# Patient Record
Sex: MALE | Race: BLACK OR AFRICAN AMERICAN | NOT HISPANIC OR LATINO
[De-identification: names, ages, dates, MRNs, and addresses within clinical notes are randomized per-mention and may not be internally consistent; named-entity substitution may affect disease eponyms.]

---

## 2017-05-26 ENCOUNTER — HEALTH MAINTENANCE LETTER (OUTPATIENT)
Age: 57
End: 2017-05-26

## 2017-08-10 ENCOUNTER — OFFICE VISIT (OUTPATIENT)
Dept: FAMILY MEDICINE | Facility: CLINIC | Age: 57
End: 2017-08-10

## 2017-08-10 VITALS
RESPIRATION RATE: 18 BRPM | OXYGEN SATURATION: 97 % | BODY MASS INDEX: 18.9 KG/M2 | DIASTOLIC BLOOD PRESSURE: 86 MMHG | WEIGHT: 147.2 LBS | TEMPERATURE: 97.6 F | HEART RATE: 106 BPM | SYSTOLIC BLOOD PRESSURE: 118 MMHG

## 2017-08-10 DIAGNOSIS — Z00.00 PREVENTATIVE HEALTH CARE: ICD-10-CM

## 2017-08-10 DIAGNOSIS — Z00.00 ROUTINE GENERAL MEDICAL EXAMINATION AT A HEALTH CARE FACILITY: Primary | ICD-10-CM

## 2017-08-10 NOTE — PATIENT INSTRUCTIONS
Here is the plan from today's visit    1. Routine general medical examination at a health care facility    2. Preventative health care  The GI team will give you a call to set up an appointment for the colonoscopy and get you the medications that you need to prepare for it.     If you have any questions or difficulties with setting up the appointment, feel free to give us a call.     - GASTROENTEROLOGY ADULT REF PROCEDURE ONLY      Please call or return to clinic if your symptoms don't go away.    Follow up plan  Please make an appointment annually with us or sooner if you have concerns.    Thank you for coming to [facility] today.  Lab Testing:  **If you had lab testing today and your results are reassuring or normal they will be mailed to you or sent through [employer] within 7 days.   **If the lab tests need quick action we will call you with the results.  The phone number we will call with results is # [phone] (home) . If this is not the best number please call our clinic and change the number.  Medication Refills:  If you need any refills please call your pharmacy and they will contact us.   If you need to  your refill at a new pharmacy, please contact the new pharmacy directly. The new pharmacy will help you get your medications transferred faster.   Scheduling:  If you have any concerns about today's visit or wish to schedule another appointment please call our office during normal business hours [phone] (8-5:00 M-F)  If a referral was made to a [facility] and you don't get a call from central scheduling please call [phone].  If a Mammogram was ordered for you at [facility] call [phone] to schedule or change your appointment.  If you had an XRay/CT/Ultrasound/MRI ordered the number is [phone] to schedule or change your radiology appointment.   Medical Concerns:  If you have urgent medical concerns please call [phone] at any time of the  day.    Preventive Health Recommendations  Male Ages 50 - 64    Yearly exam:             See your health care provider every year in order to  o   Review health changes.   o   Discuss preventive care.    o   Review your medicines if your doctor has prescribed any.     Have a cholesterol test every 5 years, or more frequently if you are at risk for high cholesterol/heart disease.     Have a diabetes test (fasting glucose) every three years. If you are at risk for diabetes, you should have this test more often.     Have a colonoscopy at age 50, or have a yearly FIT test (stool test). These exams will check for colon cancer.      Talk with your health care provider about whether or not a prostate cancer screening test (PSA) is right for you.    You should be tested each year for STDs (sexually transmitted diseases), if you re at risk.     Shots: Get a flu shot each year. Get a tetanus shot every 10 years.     Nutrition:    Eat at least 5 servings of fruits and vegetables daily.     Eat whole-grain bread, whole-wheat pasta and brown rice instead of white grains and rice.     Talk to your provider about Calcium and Vitamin D.     Lifestyle    Exercise for at least 150 minutes a week (30 minutes a day, 5 days a week). This will help you control your weight and prevent disease.     Limit alcohol to one drink per day.     No smoking.     Wear sunscreen to prevent skin cancer.     See your dentist every six months for an exam and cleaning.     See your eye doctor every 1 to 2 years.    Preventive Health Recommendations  Male Ages 50 - 64    Yearly exam:             See your health care provider every year in order to  o   Review health changes.   o   Discuss preventive care.    o   Review your medicines if your doctor has prescribed any.     Have a cholesterol test every 5 years, or more frequently if you are at risk for high cholesterol/heart disease.     Have a diabetes test (fasting glucose) every three years. If you are  at risk for diabetes, you should have this test more often.     Have a colonoscopy at age 50, or have a yearly FIT test (stool test). These exams will check for colon cancer.      Talk with your health care provider about whether or not a prostate cancer screening test (PSA) is right for you.    You should be tested each year for STDs (sexually transmitted diseases), if you re at risk.     Shots: Get a flu shot each year. Get a tetanus shot every 10 years.     Nutrition:    Eat at least 5 servings of fruits and vegetables daily.     Eat whole-grain bread, whole-wheat pasta and brown rice instead of white grains and rice.     Talk to your provider about Calcium and Vitamin D.     Lifestyle    Exercise for at least 150 minutes a week (30 minutes a day, 5 days a week). This will help you control your weight and prevent disease.     Limit alcohol to one drink per day.     No smoking.     Wear sunscreen to prevent skin cancer.     See your dentist every six months for an exam and cleaning.     See your eye doctor every 1 to 2 years.

## 2017-08-10 NOTE — Clinical Note
I changed billing to preventive code (physical) Please update problem list - needs chronic vision loss, tardive dyskinesia Eugene

## 2017-08-10 NOTE — MR AVS SNAPSHOT
After Visit Summary   8/10/2017    Delroy Foley    MRN: [id number]           Patient Information     Date Of Birth          1960        Visit Information        Provider Department      8/10/2017 1:00 PM [facility]        Today's Diagnoses     Routine general medical examination at a health care facility    -  1    Preventative health care          Care Instructions    Here is the plan from today's visit    1. Routine general medical examination at a health care facility    2. Preventative health care  The GI team will give you a call to set up an appointment for the colonoscopy and get you the medications that you need to prepare for it.     If you have any questions or difficulties with setting up the appointment, feel free to give us a call.     - GASTROENTEROLOGY ADULT REF PROCEDURE ONLY      Please call or return to clinic if your symptoms don't go away.    Follow up plan  Please make an appointment annually with us or sooner if you have concerns.    Thank you for coming to [facility] today.  Lab Testing:  **If you had lab testing today and your results are reassuring or normal they will be mailed to you or sent through [employer] within 7 days.   **If the lab tests need quick action we will call you with the results.  The phone number we will call with results is # [phone] (home) . If this is not the best number please call our clinic and change the number.  Medication Refills:  If you need any refills please call your pharmacy and they will contact us.   If you need to  your refill at a new pharmacy, please contact the new pharmacy directly. The new pharmacy will help you get your medications transferred faster.   Scheduling:  If you have any concerns about today's visit or wish to schedule another appointment please call our office during normal business hours [phone] (8-5:00 M-F)  If a referral was made to a [facility]  [place] [facility] and you don't get a call from central scheduling please call [phone].  If a Mammogram was ordered for you at [facility] call [phone] to schedule or change your appointment.  If you had an XRay/CT/Ultrasound/MRI ordered the number is [phone] to schedule or change your radiology appointment.   Medical Concerns:  If you have urgent medical concerns please call [phone] at any time of the day.    Preventive Health Recommendations  Male Ages 50 - 64    Yearly exam:             See your health care provider every year in order to  o   Review health changes.   o   Discuss preventive care.    o   Review your medicines if your doctor has prescribed any.     Have a cholesterol test every 5 years, or more frequently if you are at risk for high cholesterol/heart disease.     Have a diabetes test (fasting glucose) every three years. If you are at risk for diabetes, you should have this test more often.     Have a colonoscopy at age 50, or have a yearly FIT test (stool test). These exams will check for colon cancer.      Talk with your health care provider about whether or not a prostate cancer screening test (PSA) is right for you.    You should be tested each year for STDs (sexually transmitted diseases), if you re at risk.     Shots: Get a flu shot each year. Get a tetanus shot every 10 years.     Nutrition:    Eat at least 5 servings of fruits and vegetables daily.     Eat whole-grain bread, whole-wheat pasta and brown rice instead of white grains and rice.     Talk to your provider about Calcium and Vitamin D.     Lifestyle    Exercise for at least 150 minutes a week (30 minutes a day, 5 days a week). This will help you control your weight and prevent disease.     Limit alcohol to one drink per day.     No smoking.     Wear sunscreen to prevent skin cancer.     See your dentist every six months for an exam and cleaning.     See your eye doctor every 1 to 2 years.    Preventive  Health Recommendations  Male Ages 50 - 64    Yearly exam:             See your health care provider every year in order to  o   Review health changes.   o   Discuss preventive care.    o   Review your medicines if your doctor has prescribed any.     Have a cholesterol test every 5 years, or more frequently if you are at risk for high cholesterol/heart disease.     Have a diabetes test (fasting glucose) every three years. If you are at risk for diabetes, you should have this test more often.     Have a colonoscopy at age 50, or have a yearly FIT test (stool test). These exams will check for colon cancer.      Talk with your health care provider about whether or not a prostate cancer screening test (PSA) is right for you.    You should be tested each year for STDs (sexually transmitted diseases), if you re at risk.     Shots: Get a flu shot each year. Get a tetanus shot every 10 years.     Nutrition:    Eat at least 5 servings of fruits and vegetables daily.     Eat whole-grain bread, whole-wheat pasta and brown rice instead of white grains and rice.     Talk to your provider about Calcium and Vitamin D.     Lifestyle    Exercise for at least 150 minutes a week (30 minutes a day, 5 days a week). This will help you control your weight and prevent disease.     Limit alcohol to one drink per day.     No smoking.     Wear sunscreen to prevent skin cancer.     See your dentist every six months for an exam and cleaning.     See your eye doctor every 1 to 2 years.            Follow-ups after your visit        Additional Services     GASTROENTEROLOGY ADULT REF PROCEDURE ONLY       Last Lab Result: Creatinine (mg/dL)       Date                     Value                 07/14/2016               1.2              ----------  Body mass index is 18.9 kg/(m^2).     Needed:  No  Language:  English    Patient will be contacted to schedule procedure.     Please be aware that coverage of these services is subject to the terms  and limitations of your health insurance plan.  Call member services at your health plan with any benefit or coverage questions.  Any procedures must be performed at a [facility] facility OR coordinated by your clinic's referral office.    Please bring the following with you to your appointment:    (1) Any X-Rays, CTs or MRIs which have been performed.  Contact the facility where they were done to arrange for  prior to your scheduled appointment.    (2) List of current medications   (3) This referral request   (4) Any documents/labs given to you for this referral                  Follow-up notes from your care team     Return in about 1 year (around 8/10/2018).      Who to contact     Please call your clinic at [phone] to:    Ask questions about your health    Make or cancel appointments    Discuss your medicines    Learn about your test results    Speak to your doctor   If you have compliments or concerns about an experience at your clinic, or if you wish to file a complaint, please contact [facility] Patient Relations at [phone] or email us at [email or web address]         Additional Information About Your Visit        [employer] Information     [employer] is an electronic gateway that provides easy, online access to your medical records. With [employer], you can request a clinic appointment, read your test results, renew a prescription or communicate with your care team.     To sign up for [employer] visit the website at [email or web address]   You will be asked to enter the access code listed below, as well as some personal information. Please follow the directions to create your username and password.     Your access code is: [email or web address]  Expires: 2017  1:57 PM     Your access code will  in 90 days. If you need help or a new code, please contact your [facility] or call [phone] for assistance.        Care EveryWhere ID      This is your Care EveryWhere ID. This could be used by other organizations to access your [place] medical records  [id number]        Your Vitals Were     Pulse Temperature Respirations Pulse Oximetry BMI (Body Mass Index)       106 97.6  F (36.4  C) (Oral) 18 97% 18.9 kg/m2        Blood Pressure from Last 3 Encounters:   08/10/17 118/86   07/14/16 124/87   10/30/15 (!) 144/96    Weight from Last 3 Encounters:   08/10/17 147 lb 3.2 oz (66.8 kg)   07/14/16 161 lb (73 kg)   10/30/15 161 lb (73 kg)              We Performed the Following     GASTROENTEROLOGY ADULT REF PROCEDURE ONLY        Primary Care Provider Office Phone # Fax #    Nadya Jay [phone]       [facility] [address] [address]        Equal Access to Services     [facility] HospitalSUNDEEP : Hadii mary jane schulero Someagan, waaxda luqadaha, qaybta kaalmada adesusiyada, jagjit corona hayamanda villalta . So [facility] [phone].    ATENCIÓN: Si habla español, tiene a mackey disposición servicios gratuitos de asistencia lingüística. Jania al [phone].    We comply with applicable federal civil rights laws and [place] laws. We do not discriminate on the basis of race, color, national origin, age, disability sex, sexual orientation or gender identity.            Thank you!     Thank you for choosing [facility]  for your care. Our goal is always to provide you with excellent care. Hearing back from our patients is one way we can continue to improve our services. Please take a few minutes to complete the written survey that you may receive in the mail after your visit with us. Thank you!             Your Updated Medication List - Protect others around you: Learn how to safely use, store and throw away your medicines at www.disposemymeds.org.          This list is accurate as of: 8/10/17  1:57 PM.  Always use your most recent med list.                   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        MULTIVITAMINS PO      Take 1 tablet by mouth daily.        OLANZapine 20 MG tablet    zyPREXA    60 tablet    Take 2 tablets (40 mg) by mouth At Bedtime Please see MD before next refill    Paranoid schizophrenia, chronic condition (H)       VIAGRA 25 MG cap/tab   Generic drug:  sildenafil      Take 1 tablet by mouth as needed. 1 hour before needed

## 2017-08-10 NOTE — PROGRESS NOTES
Preceptor Attestation:   Patient seen and discussed with the resident. Assessment and plan reviewed with resident and agreed upon.   Supervising Physician:  Ced Edwards MD  [facility]

## 2017-08-10 NOTE — PROGRESS NOTES
"  Male Physical Note          HPI         Concerns today: No special concerns today.    Delroy is here for a CPE today with no specific concerns. Living in [facility]. Living by himself. Describes overall health as good.     Sees a psychiatrist - Dr. Wilcox. \"I see her for psych meds.\" Reports bipolar. Denies any manic symptoms in the past year or severe depression.    Not currently working. Has not worked in the past.     Reports overall mood as \"fine\"    No hospitalizations or ED visits in the last year.    Has had ~14 pound weight loss in the last year. Reports that this is intentional - jogging, diet.    Denies dark or tarry stools. Denies any family history of colon cancer.    Patient Active Problem List   Diagnosis     Chronic kidney disease, stage II (mild)     Essential hypertension with goal blood pressure less than 140/90     Schizoaffective disorder (H)     Dysesthesia     Abnormal colonoscopy     Diverticulosis of sigmoid colon     ACP (advance care planning)     S/P catheter ablation of slow pathway     SVT (supraventricular tachycardia) (H)     Hepatitis C virus infection without hepatic coma, unspecified chronicity       Past Medical History:   Diagnosis Date     Arrhythmia     SVT     Chronic kidney disease, stage II (mild)      Paroxysmal supraventricular tachycardia (H)      Unspecified essential hypertension        Previous Medical Care - through [facility]     History reviewed. No pertinent family history.           Review of Systems:     Review of Systems:  CONSTITUTIONAL: NEGATIVE for fever, chills, unintentional change in weight  INTEGUMENTARY/SKIN: NEGATIVE for worrisome rashes, moles or lesions  EYES: NEGATIVE for vision changes or irritation  ENT/MOUTH: NEGATIVE for ear, mouth and throat problems  RESP: NEGATIVE for significant cough or SOB  CV: NEGATIVE for chest pain, palpitations or peripheral edema  GI: NEGATIVE for nausea, abdominal pain, heartburn, or change in bowel habits  : NEGATIVE " for frequency, dysuria, or hematuria  MUSCULOSKELETAL: NEGATIVE for significant arthralgias or myalgia  NEURO: NEGATIVE for weakness, dizziness or paresthesias  ENDOCRINE: NEGATIVE for temperature intolerance, skin/hair changes  HEME/ALLERGY: NEGATIVE for bleeding problems  PSYCHIATRIC: NEGATIVE for changes in mood or affect           Social History     Social History     Social History     Marital status: Single     Spouse name: N/A     Number of children: 0     Years of education: N/A     Occupational History     Not on file.     Social History Main Topics     Smoking status: Current Every Day Smoker     Packs/day: 0.50     Years: 10.00     Types: Cigarettes     Smokeless tobacco: Never Used     Alcohol use No     Drug use: No     Sexual activity: No     Other Topics Concern     Caffeine Concern Yes     Social History Narrative    Goes to Adult Day Care     Lives alone       Marital Status: Single  Who lives in your household? No one - lives alone    Has anyone hurt you physically, for example by pushing, hitting, slapping or kicking you or forcing you to have sex? Denies  Do you feel threatened or controlled by a partner, ex-partner or anyone in your life? Denies    Sexual Health     Sexual concerns: No   STI History: Neg    Recommended Screening     ASA use (>3% risk in 5 y):  Already using  Colon CA Screening (>50-75 ):  Recommended and patient accepted testing.           Physical Exam:     Vitals: /86  Pulse 106  Temp 97.6  F (36.4  C) (Oral)  Resp 18  Wt 147 lb 3.2 oz (66.8 kg)  SpO2 97%  BMI 18.9 kg/m2  BMI= Body mass index is 18.9 kg/(m^2).     GENERAL: healthy, alert and no distress. Prominent tardive diskinetic repetitive lip smacking at rest.  EYES: R eye pupil is non-reactive (chronic vision loss). L eye with arcus senilis, reactive.  HENT: R TM is obstructed by cerumen. L TM is normal. Mouth- no ulcers, no lesions  NECK: no tenderness, no adenopathy, no asymmetry, no masses, no stiffness;  thyroid- normal to palpation  RESP: lungs clear to auscultation - no rales, no rhonchi, no wheezes  CV: tachycardic, somewhat irregular rhythm. no S3 or S4 and no murmur, no click or rub -  ABDOMEN: soft, no tenderness, no Palpable hepatosplenomegaly, no masses, normal bowel sounds  MS: extremities- no gross deformities noted, no edema  SKIN: no suspicious lesions, no rashes  NEURO: strength and tone- normal, Finger-to-nose coordination grossly normal  PSYCH: Alert and oriented times 3; speech- somewhat rapid, normal volume; able to articulate logical thoughts, able to abstract reason, no tangential thoughts, no hallucinations or delusions, affect- normal. Thoughts are logical and linear. No evidence of depression or bolivar. Some evidence of cognitive deficits - patient answers questions before they are fully asked, possible memory deficits.    Assessment and Plan      Delroy was seen today for physical.    Diagnoses and all orders for this visit:    Routine general medical examination at a health care facility    Preventative health care  -     GASTROENTEROLOGY ADULT REF PROCEDURE ONLY    Other orders  -     Cancel: GASTROENTEROLOGY ADULT REF CONSULT ONLY        1. Health Care Maintenance: Physical Exam abnormal for tardive dyskinesia    PLAN:  1.Screening Colonoscopy and Routine follow up in one year.  2. History of SVT s/p ablation: Patient is tachycardic today, but BP is WNL. Pharmacologic stress NM scan in 9/2015 was normal. Most recent EKG is 9/2015 and was normal sinus rhythm. Patient is asymptomatic today. Continue to monitor   3. History of villous adenoma on colonoscopy in 2013. Did not follow up for repeat colonoscopy which was recommended to be completed 6 months later at that time. Discussed today and Delroy agrees to have colonoscopy done. Will have [facility] follow up with patient in 1 week if not yet scheduled  4. Follow up with Dr. Wilcox as needed for management of psychiatric medications     Options for  treatment and follow-up care were reviewed with the patient. Delroy URBANO Foley and/or guardian engaged in the decision making process and verbalized understanding of the options discussed and agreed with the final plan.    Nadya Jay MD  Family Medicine PGY2

## 2017-08-14 ENCOUNTER — TELEPHONE (OUTPATIENT)
Dept: GASTROENTEROLOGY | Facility: CLINIC | Age: 57
End: 2017-08-14

## 2017-08-16 PROBLEM — G24.01 TARDIVE DYSKINESIA: Status: ACTIVE | Noted: 2017-08-16

## 2017-08-16 PROBLEM — H54.61 VISION LOSS OF RIGHT EYE: Status: ACTIVE | Noted: 2017-08-16

## 2017-08-17 ENCOUNTER — TELEPHONE (OUTPATIENT)
Dept: GASTROENTEROLOGY | Facility: CLINIC | Age: 57
End: 2017-08-17

## 2018-01-02 ENCOUNTER — OFFICE VISIT (OUTPATIENT)
Dept: FAMILY MEDICINE | Facility: CLINIC | Age: 58
End: 2018-01-02
Payer: MEDICARE

## 2018-01-02 VITALS
BODY MASS INDEX: 19.8 KG/M2 | WEIGHT: 154.2 LBS | TEMPERATURE: 100.4 F | DIASTOLIC BLOOD PRESSURE: 101 MMHG | OXYGEN SATURATION: 98 % | HEART RATE: 102 BPM | SYSTOLIC BLOOD PRESSURE: 166 MMHG

## 2018-01-02 DIAGNOSIS — J06.9 VIRAL URI: Primary | ICD-10-CM

## 2018-01-02 DIAGNOSIS — Z12.11 ENCOUNTER FOR SCREENING COLONOSCOPY: ICD-10-CM

## 2018-01-02 ASSESSMENT — ENCOUNTER SYMPTOMS
VOMITING: 0
SORE THROAT: 0
ARTHRALGIAS: 0
MYALGIAS: 0
NAUSEA: 0
CHILLS: 0
SHORTNESS OF BREATH: 0
SINUS PRESSURE: 0
COUGH: 0
CONSTIPATION: 0
EYES NEGATIVE: 1
FEVER: 1
DIARRHEA: 0
TROUBLE SWALLOWING: 0
ABDOMINAL PAIN: 0
RHINORRHEA: 1
DYSURIA: 0
HEADACHES: 0
LIGHT-HEADEDNESS: 0

## 2018-01-02 NOTE — PROGRESS NOTES
Preceptor Attestation:   Patient seen and discussed with the resident. Assessment and plan reviewed with resident and agreed upon.   Supervising Physician:  Shannan Cordero MD  [facility]

## 2018-01-02 NOTE — PROGRESS NOTES
HPI:       Delroy Foley is a 57 year old who presents for the following  Patient presents with:  Colonoscopy: Prep for colonoscopy  Fever: Pt stated that he has been having a fever x1 wk. Today he sits at 100.4F  Forms: Provider to fill    There is a 57-year-old gentleman who presents for questions regarding his upcoming colonoscopy.  He states that he has a colonoscopy scheduled for January 11, 2018.  He is unsure when to start the prep, and he or liquid restrictions.  He know he received a phone call from gastroenterology but lost the information that they provided.  He also received a letter but that was also lost.    Of note patient says that he has a resolving cold.  He says that he had a runny nose, dry cough, mild sore throat all of which is resolving.  He says that none of his symptoms are concerning to him currently.  He says that he also had some diarrhea but that all resolved a couple of days ago.  Of note he is febrile today in clinic with a low-grade fever of 100.4 F.  He says that he does not feel febrile and does not have any shaking chills.           Problem, Medication and Allergy Lists were reviewed and are current.  Patient is an established patient of this clinic.         Review of Systems:   Review of Systems   Constitutional: Positive for fever. Negative for chills.   HENT: Positive for rhinorrhea. Negative for ear pain, sinus pressure, sneezing, sore throat and trouble swallowing.    Eyes: Negative.    Respiratory: Negative for cough and shortness of breath.    Cardiovascular: Negative for chest pain.   Gastrointestinal: Negative for abdominal pain, constipation, diarrhea, nausea and vomiting.   Genitourinary: Negative for dysuria.   Musculoskeletal: Negative for arthralgias and myalgias.   Neurological: Negative for light-headedness and headaches.             Physical Exam:   Patient Vitals for the past 24 hrs:   BP Temp Temp src Pulse SpO2 Weight   01/02/18 1326 (!) 166/101 - - - -  -   01/02/18 1325 (!) 164/101 100.4  F (38  C) Oral 102 98 % 154 lb 3.2 oz (69.9 kg)     Body mass index is 19.8 kg/(m^2).  Vital signs normal except low grade fever of 100.4     Physical Exam   Constitutional: No distress.   Appears disheveled    HENT:   Right Ear: External ear normal.   Left Ear: External ear normal.   Mouth/Throat: Oropharynx is clear and moist. No oropharyngeal exudate.   Clear nasal drainage with mildly edematous nasal mucous membranes   Eyes: Conjunctivae are normal.   Cardiovascular: Normal rate, regular rhythm and normal heart sounds.    No murmur heard.  Pulmonary/Chest: Effort normal and breath sounds normal. No respiratory distress. He has no wheezes.   Lymphadenopathy:     He has no cervical adenopathy.   Skin: Skin is warm and dry.   Psychiatric: His mood appears anxious. Cognition and memory are impaired. He exhibits abnormal recent memory and abnormal remote memory.         Results:     Results from last visit:  Office Visit on 07/14/2016   Component Date Value Ref Range Status     Albumin 07/14/2016 4.0  3.3 - 4.9 mg/dL Final     Alkaline Phosphatase 07/14/2016 46.0  40.0 - 150.0 U/L Final     ALT 07/14/2016 8.9  0.0 - 45.0 U/L Final     AST 07/14/2016 13.9  0.0 - 55.0 U/L Final     Bilirubin Total 07/14/2016 0.3  0.2 - 1.3 mg/dL Final     Urea Nitrogen 07/14/2016 10.0  7.0 - 21.0 mg/dL Final     Calcium 07/14/2016 9.3  8.5 - 10.1 mg/dL Final     Chloride 07/14/2016 106.6  98.0 - 110.0 mmol/L Final     Carbon Dioxide 07/14/2016 27.0  20.0 - 32.0 mmol/L Final     Creatinine 07/14/2016 1.2  0.7 - 1.3 mg/dL Final     Glucose 07/14/2016 89.7  70.0 - 99.0 mg'dL Final     Potassium 07/14/2016 4.1  3.2 - 4.6 mmol/dL Final     Sodium 07/14/2016 146.0* 132.0 - 143.0 mmol/L Final     Protein Total 07/14/2016 6.7* 6.8 - 8.8 g/dL Final     GFR Estimate 07/14/2016 66.6  mL/min/1.7 m2 Final     GFR Estimate If Black 07/14/2016 80.5  mL/min/1.7 m2 Final     WBC 07/14/2016 7.5  4.0 - 11.0 K/uL Final      Lymphocytes # 07/14/2016 2.6  0.8 - 5.3 K/uL Final     % Lymphocytes 07/14/2016 34.8  20.0 - 48.0 %L Final     Mid # 07/14/2016 0.7  0.0 - 2.2 K/uL Final     Mid % 07/14/2016 8.9  0.0 - 20.0 %M Final     GRANULOCYTES # 07/14/2016 4.2  1.6 - 8.3 K/uL Final     % Granulocytes 07/14/2016 56.3  40.0 - 75.0 %G Final     RBC 07/14/2016 4.31* 4.40 - 5.90 M/uL Final     Hemoglobin 07/14/2016 13.6  13.3 - 17.7 g/dL Final     Hematocrit 07/14/2016 43.1  40.0 - 53.0 % Final     MCV 07/14/2016 99.9  78.0 - 100.0 fL Final     MCH 07/14/2016 31.6  26.5 - 35.0 pg Final     MCHC 07/14/2016 31.6* 32.0 - 36.0 g/dL Final     Platelets 07/14/2016 401.0  150.0 - 450.0 K/uL Final     Cholesterol 07/14/2016 124.0  0.0 - 200.0 mg/dL Final     Cholesterol/HDL Ratio 07/14/2016 3.1  0.0 - 5.0 Final     HDL Cholesterol 07/14/2016 40.6  >40.0 mg/dL Final     LDL Cholesterol Calculated 07/14/2016 60  0 - 129 mg/dL Final     Triglycerides 07/14/2016 116.6  0.0 - 150.0 mg/dL Final     VLDL Cholesterol 07/14/2016 23.3  7.0 - 32.0 mg/dL Final     HIV Antigen Antibody Combo 07/14/2016   NR Final                    Value:Nonreactive   HIV-1 p24 Ag & HIV-1/HIV-2 Ab Not Detected         Assessment and Plan       1. Viral URI  Aside from low-grade fever the patient denies any other significant symptoms right now.  No evidence of urinary tract infection, gastroenteritis, intra-abdominal infection, intracranial infection, or pneumonia on exam.  Given that the patient is scheduled to undergo the procedure with some conscious sedation, I advised him to notify the gastroenterologist if he is still having fevers or symptoms one day prior to his procedure as it would be unfortunate to undergo bowel prep and then have the colonoscopy canceled due to upper respiratory tract infection    2. Encounter for screening colonoscopy  Patient had a suboptimal colonoscopy back in 2013 with a 10 mm villous polyp found at that time.  Patient has previously been reluctant  to undergo repeat colonoscopy, however, at his last annual physical he agreed to the procedure.  There is significant confusion as to the preprocedure prep.  The patient's  was in the room.  She was given the number for the patient's gastroenterologist who will be doing the colonoscopy.  I advised them to call the gastroenterologist to obtain instructions regarding prep, p.o. intake, and any other restrictions.      There are no discontinued medications.  Options for treatment and follow-up care were reviewed with the patient. Delroy Foley  engaged in the decision making process and verbalized understanding of the options discussed and agreed with the final plan.    Paulino Plascencia MD

## 2018-01-02 NOTE — MR AVS SNAPSHOT
After Visit Summary   1/2/2018    Delroy Foley    MRN: [id number]           Patient Information     Date Of Birth          1960        Visit Information        Provider Department      1/2/2018 1:20 PM Paulino Plascencia MD [facility]        Today's Diagnoses     Viral URI    -  1    Encounter for screening colonoscopy          Care Instructions    Here is the plan from today's visit    1. Viral URI  Monitor for fevers at home.  If persistent fevers or worsening symptoms, please return to clinic.  Call clinic for worsening cough, chest pain, shortness of breath, confusion, urinary symptoms, myalgias, sore throat, diarrhea, vomiting.  If still sick day before colonoscopy, call GI doctor to let them know    2. Encounter for screening colonoscopy  Patient given contact information for gastroenterologist; told to contact them to ask when to start GoLytely prep, when to stop eating, when to stop drinking.      Please call or return to clinic if your symptoms don't go away.    Follow up plan  Please make a clinic appointment for follow up with your primary physician Nadya Jay as needed.  Thank you for coming to [facility] today.  Lab Testing:  **If you had lab testing today and your results are reassuring or normal they will be mailed to you or sent through [employer] within 7 days.   **If the lab tests need quick action we will call you with the results.  The phone number we will call with results is # [phone] (home) . If this is not the best number please call our clinic and change the number.  Medication Refills:  If you need any refills please call your pharmacy and they will contact us.   If you need to  your refill at a new pharmacy, please contact the new pharmacy directly. The new pharmacy will help you get your medications transferred faster.   Scheduling:  If you have any concerns about today's visit or wish to schedule another appointment  please call our office during normal business hours [phone] (8-5:00 M-F)  If a referral was made to a [facility] and you don't get a call from central scheduling please call [phone].  If a Mammogram was ordered for you at [facility] call [phone] to schedule or change your appointment.  If you had an XRay/CT/Ultrasound/MRI ordered the number is [phone] to schedule or change your radiology appointment.   Medical Concerns:  If you have urgent medical concerns please call [phone] at any time of the day.            Follow-ups after your visit        Your next 10 appointments already scheduled     Jan 11, 2018   Procedure with Keely Rai MD   [facility] [place]    [address] [place] [id number] [phone]           The [facility] is located on the corner of [address] and [address] on the [facility]. It is easily accessible from virtually any point in the [facility] area, via [place] and [place].              Who to contact     Please call your clinic at [phone] to:    Ask questions about your health    Make or cancel appointments    Discuss your medicines    Learn about your test results    Speak to your doctor   If you have compliments or concerns about an experience at your clinic, or if you wish to file a complaint, please contact [facility] Patient Relations at [phone] or email us at [email or web address]         Additional Information About Your Visit        Gastrofyhart Information     Konokopia is an electronic gateway that provides easy, online access to your medical records. With Konokopia, you can request a clinic appointment, read your test results, renew a prescription or communicate with your care team.     To sign up for [employer] visit the website at [email or web address]    You will be asked to enter the access code listed below, as well as some personal information. Please follow the directions to create your username and password.     Your access code is: [email or web address]  Expires: 2018  2:07 PM     Your access code will  in 90 days. If you need help or a new code, please contact your [facility] or call [phone] for assistance.        Care EveryWhere ID     This is your Care EveryWhere ID. This could be used by other organizations to access your [facility] medical records  [id number]        Your Vitals Were     Pulse Temperature Pulse Oximetry BMI (Body Mass Index)          102 100.4  F (38  C) (Oral) 98% 19.8 kg/m2         Blood Pressure from Last 3 Encounters:   18 (!) 166/101   08/10/17 118/86   16 124/87    Weight from Last 3 Encounters:   18 154 lb 3.2 oz (69.9 kg)   08/10/17 147 lb 3.2 oz (66.8 kg)   16 161 lb (73 kg)              Today, you had the following     No orders found for display         Today's Medication Changes          These changes are accurate as of: 18  2:07 PM.  If you have any questions, ask your nurse or doctor.               Start taking these medicines.        Dose/Directions    polyethylene glycol 236 G suspension   Commonly known as:  GOLYTELY   Used for:  Encounter for screening colonoscopy   Started by:  Yael Raymond RN        Dose:  4 L   Take 4,000 mLs (4 L) by mouth once for 1 dose   Quantity:  4000 mL   Refills:  0            Where to get your medicines      These medications were sent to [facility] [place], [place] - [address], [facility] [address]     Phone:  [phone]     polyethylene glycol 236 G suspension                Primary Care Provider Office Phone # Fax #    Nadya Jay [phone]       [facility] [place] [address]  [facility] [address]        Equal Access to Services     CASE  GAAR : Hadii aad sage fausto Jimenez, waaxda luqadaha, qaybta kaarikda apoorva, jagjit cj hayamanda zamudioaugustusheath villalta . So [facility] [phone].    ATENCIÓN: Si habla español, tiene a mackey disposición servicios gratuitos de asistencia lingüística. Llame al [phone].    We comply with applicable federal civil rights laws and [place] laws. We do not discriminate on the basis of race, color, national origin, age, disability, sex, sexual orientation, or gender identity.            Thank you!     Thank you for choosing [facility]  for your care. Our goal is always to provide you with excellent care. Hearing back from our patients is one way we can continue to improve our services. Please take a few minutes to complete the written survey that you may receive in the mail after your visit with us. Thank you!             Your Updated Medication List - Protect others around you: Learn how to safely use, store and throw away your medicines at www.disposemymeds.org.          This list is accurate as of: 1/2/18  2:07 PM.  Always use your most recent med list.                   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        MULTIVITAMINS PO      Take 1 tablet by mouth daily.        OLANZapine 20 MG tablet    zyPREXA    60 tablet    Take 2 tablets (40 mg) by mouth At Bedtime Please see MD before next refill    Paranoid schizophrenia, chronic condition (H)       polyethylene glycol 236 G suspension    GOLYTELY    4000 mL    Take 4,000 mLs (4 L) by mouth once for 1 dose    Encounter for screening colonoscopy       VIAGRA 25 MG tablet   Generic drug:  sildenafil      Take 1 tablet by mouth as needed. 1 hour before needed

## 2018-01-02 NOTE — PATIENT INSTRUCTIONS
Here is the plan from today's visit    1. Viral URI  Monitor for fevers at home.  If persistent fevers or worsening symptoms, please return to clinic.  Call clinic for worsening cough, chest pain, shortness of breath, confusion, urinary symptoms, myalgias, sore throat, diarrhea, vomiting.  If still sick day before colonoscopy, call GI doctor to let them know    2. Encounter for screening colonoscopy  Patient given contact information for gastroenterologist; told to contact them to ask when to start GoLytely prep, when to stop eating, when to stop drinking.      Please call or return to clinic if your symptoms don't go away.    Follow up plan  Please make a clinic appointment for follow up with your primary physician Nadya Jay as needed.  Thank you for coming to [facility] today.  Lab Testing:  **If you had lab testing today and your results are reassuring or normal they will be mailed to you or sent through [email or web address] within 7 days.   **If the lab tests need quick action we will call you with the results.  The phone number we will call with results is # [phone] (home) . If this is not the best number please call our clinic and change the number.  Medication Refills:  If you need any refills please call your pharmacy and they will contact us.   If you need to  your refill at a new pharmacy, please contact the new pharmacy directly. The new pharmacy will help you get your medications transferred faster.   Scheduling:  If you have any concerns about today's visit or wish to schedule another appointment please call our office during normal business hours [phone] (8-5:00 M-F)  If a referral was made to a [facility] and you don't get a call from central scheduling please call [phone].  If a Mammogram was ordered for you at [facility] call [phone] to schedule or change your appointment.  If you had an XRay/CT/Ultrasound/MRI ordered the number is  [phone] to schedule or change your radiology appointment.   Medical Concerns:  If you have urgent medical concerns please call [phone] at any time of the day.

## 2018-01-11 ENCOUNTER — HOSPITAL ENCOUNTER (OUTPATIENT)
Facility: CLINIC | Age: 58
Discharge: HOME | End: 2018-01-11
Attending: INTERNAL MEDICINE | Admitting: INTERNAL MEDICINE
Payer: MEDICARE

## 2018-01-11 ENCOUNTER — SURGERY (OUTPATIENT)
Age: 58
End: 2018-01-11

## 2018-01-11 ENCOUNTER — ANESTHESIA (OUTPATIENT)
Dept: GASTROENTEROLOGY | Facility: CLINIC | Age: 58
End: 2018-01-11
Payer: MEDICARE

## 2018-01-11 ENCOUNTER — ANESTHESIA EVENT (OUTPATIENT)
Dept: GASTROENTEROLOGY | Facility: CLINIC | Age: 58
End: 2018-01-11
Payer: MEDICARE

## 2018-01-11 VITALS
BODY MASS INDEX: 17.72 KG/M2 | DIASTOLIC BLOOD PRESSURE: 107 MMHG | TEMPERATURE: 97.4 F | OXYGEN SATURATION: 98 % | WEIGHT: 138 LBS | HEART RATE: 93 BPM | RESPIRATION RATE: 26 BRPM | SYSTOLIC BLOOD PRESSURE: 147 MMHG

## 2018-01-11 LAB — COLONOSCOPY: NORMAL

## 2018-01-11 PROCEDURE — 45378 DIAGNOSTIC COLONOSCOPY: CPT | Performed by: INTERNAL MEDICINE

## 2018-01-11 PROCEDURE — 25000128 H RX IP 250 OP 636: Performed by: NURSE ANESTHETIST, CERTIFIED REGISTERED

## 2018-01-11 PROCEDURE — 25000125 ZZHC RX 250: Performed by: NURSE ANESTHETIST, CERTIFIED REGISTERED

## 2018-01-11 PROCEDURE — 25000132 ZZH RX MED GY IP 250 OP 250 PS 637: Mod: GY | Performed by: INTERNAL MEDICINE

## 2018-01-11 PROCEDURE — 37000009 ZZH ANESTHESIA TECHNICAL FEE, EACH ADDTL 15 MIN: Performed by: INTERNAL MEDICINE

## 2018-01-11 PROCEDURE — G0105 COLORECTAL SCRN; HI RISK IND: HCPCS | Mod: 74 | Performed by: INTERNAL MEDICINE

## 2018-01-11 PROCEDURE — 37000008 ZZH ANESTHESIA TECHNICAL FEE, 1ST 30 MIN: Performed by: INTERNAL MEDICINE

## 2018-01-11 RX ORDER — NALOXONE HYDROCHLORIDE 0.4 MG/ML
.1-.4 INJECTION, SOLUTION INTRAMUSCULAR; INTRAVENOUS; SUBCUTANEOUS
Status: DISCONTINUED | OUTPATIENT
Start: 2018-01-11 | End: 2018-01-11 | Stop reason: HOSPADM

## 2018-01-11 RX ORDER — SODIUM CHLORIDE, SODIUM LACTATE, POTASSIUM CHLORIDE, CALCIUM CHLORIDE 600; 310; 30; 20 MG/100ML; MG/100ML; MG/100ML; MG/100ML
INJECTION, SOLUTION INTRAVENOUS CONTINUOUS
Status: DISCONTINUED | OUTPATIENT
Start: 2018-01-11 | End: 2018-01-11 | Stop reason: HOSPADM

## 2018-01-11 RX ORDER — PROPOFOL 10 MG/ML
INJECTION, EMULSION INTRAVENOUS PRN
Status: DISCONTINUED | OUTPATIENT
Start: 2018-01-11 | End: 2018-01-11

## 2018-01-11 RX ORDER — ONDANSETRON 4 MG/1
4 TABLET, ORALLY DISINTEGRATING ORAL EVERY 30 MIN PRN
Status: DISCONTINUED | OUTPATIENT
Start: 2018-01-11 | End: 2018-01-11 | Stop reason: HOSPADM

## 2018-01-11 RX ORDER — MEPERIDINE HYDROCHLORIDE 25 MG/ML
12.5 INJECTION INTRAMUSCULAR; INTRAVENOUS; SUBCUTANEOUS
Status: DISCONTINUED | OUTPATIENT
Start: 2018-01-11 | End: 2018-01-11 | Stop reason: HOSPADM

## 2018-01-11 RX ORDER — SIMETHICONE
LIQUID (ML) MISCELLANEOUS PRN
Status: DISCONTINUED | OUTPATIENT
Start: 2018-01-11 | End: 2018-01-11 | Stop reason: HOSPADM

## 2018-01-11 RX ORDER — SODIUM CHLORIDE, SODIUM LACTATE, POTASSIUM CHLORIDE, CALCIUM CHLORIDE 600; 310; 30; 20 MG/100ML; MG/100ML; MG/100ML; MG/100ML
INJECTION, SOLUTION INTRAVENOUS CONTINUOUS PRN
Status: DISCONTINUED | OUTPATIENT
Start: 2018-01-11 | End: 2018-01-11

## 2018-01-11 RX ORDER — LIDOCAINE HYDROCHLORIDE 20 MG/ML
INJECTION, SOLUTION INFILTRATION; PERINEURAL PRN
Status: DISCONTINUED | OUTPATIENT
Start: 2018-01-11 | End: 2018-01-11

## 2018-01-11 RX ORDER — ONDANSETRON 2 MG/ML
4 INJECTION INTRAMUSCULAR; INTRAVENOUS
Status: DISCONTINUED | OUTPATIENT
Start: 2018-01-11 | End: 2018-01-11 | Stop reason: HOSPADM

## 2018-01-11 RX ORDER — FENTANYL CITRATE 50 UG/ML
INJECTION, SOLUTION INTRAMUSCULAR; INTRAVENOUS PRN
Status: DISCONTINUED | OUTPATIENT
Start: 2018-01-11 | End: 2018-01-11

## 2018-01-11 RX ORDER — PROPOFOL 10 MG/ML
INJECTION, EMULSION INTRAVENOUS CONTINUOUS PRN
Status: DISCONTINUED | OUTPATIENT
Start: 2018-01-11 | End: 2018-01-11

## 2018-01-11 RX ORDER — ONDANSETRON 2 MG/ML
INJECTION INTRAMUSCULAR; INTRAVENOUS PRN
Status: DISCONTINUED | OUTPATIENT
Start: 2018-01-11 | End: 2018-01-11

## 2018-01-11 RX ORDER — ONDANSETRON 2 MG/ML
4 INJECTION INTRAMUSCULAR; INTRAVENOUS EVERY 30 MIN PRN
Status: DISCONTINUED | OUTPATIENT
Start: 2018-01-11 | End: 2018-01-11 | Stop reason: HOSPADM

## 2018-01-11 RX ADMIN — SODIUM CHLORIDE, POTASSIUM CHLORIDE, SODIUM LACTATE AND CALCIUM CHLORIDE: 600; 310; 30; 20 INJECTION, SOLUTION INTRAVENOUS at 08:58

## 2018-01-11 RX ADMIN — PROPOFOL 20 MG: 10 INJECTION, EMULSION INTRAVENOUS at 09:07

## 2018-01-11 RX ADMIN — PROPOFOL 20 MG: 10 INJECTION, EMULSION INTRAVENOUS at 09:10

## 2018-01-11 RX ADMIN — ONDANSETRON 4 MG: 2 INJECTION INTRAMUSCULAR; INTRAVENOUS at 09:12

## 2018-01-11 RX ADMIN — MIDAZOLAM 1 MG: 1 INJECTION INTRAMUSCULAR; INTRAVENOUS at 09:00

## 2018-01-11 RX ADMIN — Medication 2 ML: at 09:09

## 2018-01-11 RX ADMIN — LIDOCAINE HYDROCHLORIDE 40 MG: 20 INJECTION, SOLUTION INFILTRATION; PERINEURAL at 09:00

## 2018-01-11 RX ADMIN — PROPOFOL 100 MCG/KG/MIN: 10 INJECTION, EMULSION INTRAVENOUS at 09:07

## 2018-01-11 RX ADMIN — FENTANYL CITRATE 50 MCG: 50 INJECTION, SOLUTION INTRAMUSCULAR; INTRAVENOUS at 09:00

## 2018-01-11 NOTE — ANESTHESIA POSTPROCEDURE EVALUATION
Patient: Delroy Foley    Procedure(s):  colonoscopy - Wound Class: II-Clean Contaminated    Diagnosis:Colonoscopy screening   Diagnosis Additional Information: No value filed.    Anesthesia Type:  MAC    Note:  Anesthesia Post Evaluation    Patient location during evaluation: PACU  Patient participation: Able to fully participate in evaluation  Level of consciousness: awake  Pain management: adequate  Airway patency: patent  Cardiovascular status: acceptable  Respiratory status: acceptable  Hydration status: acceptable  PONV: none     Anesthetic complications: None          Last vitals:  Vitals:    01/11/18 0953 01/11/18 0954 01/11/18 1009   BP:      Pulse:      Resp: 17 26    Temp:      SpO2: 97% 97% 98%         Electronically Signed By: Poncho Rodriguez MD  January 11, 2018  10:45 AM

## 2018-01-11 NOTE — IP AVS SNAPSHOT
[facility] [place], Endoscopy    [address] [address]    Phone:  [phone]                                       After Visit Summary   1/11/2018    Delroy Foley    MRN: [id number]           After Visit Summary Signature Page     I have received my discharge instructions, and my questions have been answered. I have discussed any challenges I see with this plan with the nurse or doctor.    ..........................................................................................................................................  Patient/Patient Representative Signature      ..........................................................................................................................................  Patient Representative Print Name and Relationship to Patient    ..................................................               ................................................  Date                                            Time    ..........................................................................................................................................  Reviewed by Signature/Title    ...................................................              ..............................................  Date                                                            Time

## 2018-01-11 NOTE — IP AVS SNAPSHOT
"                  MRN:[id number]                      After Visit Summary   1/11/2018    Delroy Foley    MRN: [id number]           Thank you!     Thank you for choosing [facility] for your care. Our goal is always to provide you with excellent care. Hearing back from our patients is one way we can continue to improve our services. Please take a few minutes to complete the written survey that you may receive in the mail after you visit with us. Thank you!        Patient Information     Date Of Birth          1960        About your hospital stay     You were admitted on:  January 11, 2018 You last received care in the:  [facility], Endoscopy    You were discharged on:  January 11, 2018       Who to Call     For medical emergencies, please call 911.  For non-urgent questions about your medical care, please call your primary care provider or clinic, [phone]  For questions related to your surgery, please call your surgery clinic        Attending Provider     Provider Specialty    Keely Rai MD Internal Medicine       Primary Care Provider Office Phone # Fax #    Nadya MD [place] [phone]      Pending Results     No orders found from 1/9/2018 to 1/12/2018.            Admission Information     Date & Time Provider Department Dept. Phone    1/11/2018 Keely Rai MD [facility], [facility], Endoscopy [phone]      Your Vitals Were     Blood Pressure Pulse Temperature Respirations Weight Pulse Oximetry    133/98 93 97.4  F (36.3  C) (Axillary) 20 62.6 kg (138 lb) 96%    BMI (Body Mass Index)                   17.72 kg/m2           MyChart Information     [employer] lets you send messages to your doctor, view your test results, renew your prescriptions, schedule appointments and more. To sign up, go to [email or web address] . Click on \"Log in\" on the left side of the screen, which will take you to the Welcome page. Then click on \"Sign up Now\" on the right side of " the page.     You will be asked to enter the access code listed below, as well as some personal information. Please follow the directions to create your username and password.     Your access code is: [email or web address]  Expires: 2018  2:07 PM     Your access code will  in 90 days. If you need help or a new code, please call your [place] clinic or [phone].        Care EveryWhere ID     This is your Care EveryWhere ID. This could be used by other organizations to access your [place] medical records  [id number]        Equal Access to Services     [facility]: Hadii mary jane cazares haddeanna Someagan, waaxda luqadaha, qaybta kaalmasri guerin, jagjit villalta . So [facility] [phone].    ATENCIÓN: Si habla español, tiene a mackey disposición servicios gratuitos de asistencia lingüística. Llame al [phone].    We comply with applicable federal civil rights laws and [place] laws. We do not discriminate on the basis of race, color, national origin, age, disability, sex, sexual orientation, or gender identity.               Review of your medicines      UNREVIEWED medicines. Ask your doctor about these medicines        Dose / Directions    acetaminophen 500 MG tablet   Commonly known as:  TYLENOL   Used for:  Shoulder joint pain, right        Dose:  1000 mg   Take 2 tablets (1,000 mg) by mouth every 6 hours as needed for mild pain   Quantity:  100 tablet   Refills:  0       amLODIPine 5 MG tablet   Commonly known as:  NORVASC   Used for:  Benign essential hypertension        Dose:  5 mg   Take 1 tablet (5 mg) by mouth daily   Quantity:  90 tablet   Refills:  3       aspirin 81 MG tablet   Used for:  Unspecified essential hypertension        Dose:  81 mg   Take 1 tablet (81 mg) by mouth daily   Quantity:  90 tablet   Refills:  3       benztropine 2 MG tablet   Commonly known as:  COGENTIN   Used for:  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   Quantity:  90 tablet   Refills:  0       divalproex 500 MG 24 hr tablet   Commonly known as:  DEPAKOTE ER   Used for:  Paranoid schizophrenia, chronic condition (H)        Dose:  2000 mg   Take 4 tablets (2,000 mg) by mouth At Bedtime   Quantity:  120 tablet   Refills:  2       hydrOXYzine 25 MG tablet   Commonly known as:  ATARAX        Dose:  25 mg   Take 25 mg by mouth 2 times daily.   Refills:  0       MULTIVITAMINS PO        Dose:  1 tablet   Take 1 tablet by mouth daily.   Refills:  0       OLANZapine 20 MG tablet   Commonly known as:  zyPREXA   Used for:  Paranoid schizophrenia, chronic condition (H)        Dose:  40 mg   Take 2 tablets (40 mg) by mouth At Bedtime Please see MD before next refill   Quantity:  60 tablet   Refills:  0       VIAGRA 25 MG tablet   Generic drug:  sildenafil        Dose:  1 tablet   Take 1 tablet by mouth as needed. 1 hour before needed   Refills:  0                Protect others around you: Learn how to safely use, store and throw away your medicines at www.disposemymeds.org.             Medication List: This is a list of all your medications and when to take them. Check marks below indicate your daily home schedule. Keep this list as a reference.      Medications           Morning Afternoon Evening Bedtime As Needed    acetaminophen 500 MG tablet   Commonly known as:  TYLENOL   Take 2 tablets (1,000 mg) by mouth every 6 hours as needed for mild pain                                 amLODIPine 5 MG tablet   Commonly known as:  NORVASC   Take 1 tablet (5 mg) by mouth daily                                aspirin 81 MG tablet   Take 1 tablet (81 mg) by mouth daily                                benztropine 2 MG tablet   Commonly known as:  COGENTIN   Take 1 tablet (2 mg) by mouth daily as needed                                cholecalciferol 15788 UNITS capsule   Commonly known as:  VITAMIN D3   Take 1 capsule (50,000 Units) by mouth once a week                                clonazePAM 0.5 MG tablet   Commonly known as:  klonoPIN   Take 1 tab po q AM and take 2 tabs po q HS.                                divalproex 500 MG 24 hr tablet   Commonly known as:  DEPAKOTE ER   Take 4 tablets (2,000 mg) by mouth At Bedtime                                hydrOXYzine 25 MG tablet   Commonly known as:  ATARAX   Take 25 mg by mouth 2 times daily.                                MULTIVITAMINS PO   Take 1 tablet by mouth daily.                                OLANZapine 20 MG tablet   Commonly known as:  zyPREXA   Take 2 tablets (40 mg) by mouth At Bedtime Please see MD before next refill                                VIAGRA 25 MG tablet   Take 1 tablet by mouth as needed. 1 hour before needed   Generic drug:  sildenafil

## 2018-01-11 NOTE — ANESTHESIA PREPROCEDURE EVALUATION
Anesthesia Evaluation         Anesthesia Plan      History & Physical Review  History and physical reviewed and following examination; no interval change.    ASA Status:  3 .    NPO Status:  > 6 hours    Plan for MAC with Intravenous induction. Maintenance will be TIVA.  Reason for MAC:  Difficulty with conscious sedation (QS)         Postoperative Care      Consents  Anesthetic plan, risks, benefits and alternatives discussed with:  Patient..          ANESTHESIA PREOP EVALUATION    NPO Status:  Yes, NPO    Procedure: Procedure(s):  colonoscopy - Wound Class: II-Clean Contaminated    HPI: Presents for colonoscopy     PMHx/PSHx/ROS:  PAST MEDICAL HISTORY:   Past Medical History:   Diagnosis Date     Arrhythmia     SVT     Chronic kidney disease, stage II (mild)      Paroxysmal supraventricular tachycardia (H)      Unspecified essential hypertension        PAST SURGICAL HISTORY:   Past Surgical History:   Procedure Laterality Date     COLONOSCOPY  2013     EP ABLATION / EP STUDIES  9/17/15    attempted SVT ablation, unable to induce       FAMILY HISTORY: History reviewed. No pertinent family history.      Past Anes Hx: No personal or family h/o anesthesia problems    Soc Hx:   Tobacco:   EtOH:     Allergies:   Allergies   Allergen Reactions     Haldol [Haloperidol]      Throat swelling     Risperdal [Risperidone]      Throat swelling       Meds:   Prescriptions Prior to Admission   Medication Sig Dispense Refill Last Dose     cholecalciferol (VITAMIN D3) 42130 UNITS capsule Take 1 capsule (50,000 Units) by mouth once a week 10 capsule 0 1/10/2018     amLODIPine (NORVASC) 5 MG tablet Take 1 tablet (5 mg) by mouth daily 90 tablet 3 1/10/2018     OLANZapine (ZYPREXA) 20 MG tablet Take 2 tablets (40 mg) by mouth At Bedtime Please see MD before next refill 60 tablet 0 1/10/2018     clonazePAM (KLONOPIN) 0.5 MG tablet Take 1 tab po q AM and take 2 tabs po q HS. 90 tablet 0 1/10/2018     benztropine (COGENTIN) 2 MG tablet  Take 1 tablet (2 mg) by mouth daily as needed 30 tablet 2 1/10/2018     divalproex (DEPAKOTE ER) 500 MG 24 hr tablet Take 4 tablets (2,000 mg) by mouth At Bedtime 120 tablet 2 1/10/2018     acetaminophen (TYLENOL) 500 MG tablet Take 2 tablets (1,000 mg) by mouth every 6 hours as needed for mild pain 100 tablet 0 Past Month     hydrOXYzine (ATARAX) 25 MG tablet Take 25 mg by mouth 2 times daily.   1/10/2018     Multiple Vitamin (MULTIVITAMINS PO) Take 1 tablet by mouth daily.   1/10/2018     aspirin 81 MG tablet Take 1 tablet (81 mg) by mouth daily 90 tablet 3 More than a month     sildenafil (VIAGRA) 25 MG tablet Take 1 tablet by mouth as needed. 1 hour before needed   Unknown       No current outpatient prescriptions on file.       Physical Exam:  VS: T 97.6, P 84, /94, R 18, SpO2 97% Weight   Wt Readings from Last 2 Encounters:   01/11/18 62.6 kg (138 lb)   01/02/18 69.9 kg (154 lb 3.2 oz)         Airway: MP 2, TM>3FB, Neck full ROM  Dentition: no loose teeth  Heart: RRR  Lungs: CTAB      BMP:  Lab Results   Component Value Date    .0 07/14/2016      Lab Results   Component Value Date    POTASSIUM 4.1 07/14/2016     Lab Results   Component Value Date    CHLORIDE 106.6 07/14/2016     Lab Results   Component Value Date    СВЕТЛАНА 9.3 07/14/2016     Lab Results   Component Value Date    CO2 27.0 07/14/2016     Lab Results   Component Value Date    BUN 10.0 07/14/2016     Lab Results   Component Value Date    CR 1.2 07/14/2016     Lab Results   Component Value Date    GLC 89.7 07/14/2016        CBC:  Lab Results   Component Value Date    WBC 7.2 09/17/2015     Lab Results   Component Value Date    HGB 13.6 07/14/2016     Lab Results   Component Value Date    HCT 43.1 07/14/2016     Lab Results   Component Value Date     09/17/2015        Coags/Type and Screen  Lab Results   Component Value Date    INR 1.00 09/17/2015     No results found for: PT  Type and Screen:      Assessment/Plan:  - ASA 3  - MAC  with standard ASA monitors, IV induction, balanced anesthetic  - PIV  - Antibiotics per surgery  - PONV prophylaxis  - Blood products available, possible administration discussed with patient  Poncho Rodriguez MD    1/11/2018  9:17 AM

## 2018-01-11 NOTE — ANESTHESIA CARE TRANSFER NOTE
Patient: Delory Foley    Procedure(s):  colonoscopy - Wound Class: II-Clean Contaminated    Diagnosis: Colonoscopy screening   Diagnosis Additional Information: No value filed.    Anesthesia Type:   No value filed.     Note:  Airway :Nasal Cannula  Patient transferred to:Phase II  Comments: Anesthesia Care Transfer Note      Transfer to:  Endo phase 2    Patient Vital Signs:  Stable    Airway:  None    Patient transported to PACU with supplemental O2.  Patient alert and breathing comfortably.  VSS.  Care transferred with report to PACU RN.    Ranjeet Waller CRNAHandoff Report: Identifed the Patient, Identified the Reponsible Provider, Reviewed the pertinent medical history, Discussed the surgical course, Reviewed Intra-OP anesthesia mangement and issues during anesthesia, Set expectations for post-procedure period and Allowed opportunity for questions and acknowledgement of understanding      Vitals: (Last set prior to Anesthesia Care Transfer)    CRNA VITALS  1/11/2018 0902 - 1/11/2018 0938      1/11/2018             Pulse: 74    Ht Rate: 74    SpO2: 97 %    Resp Rate (set): 10                Electronically Signed By: CHET Maurice CRNA  January 11, 2018  9:38 AM

## 2018-02-12 ENCOUNTER — TELEPHONE (OUTPATIENT)
Dept: GASTROENTEROLOGY | Facility: CLINIC | Age: 58
End: 2018-02-12

## 2018-02-12 DIAGNOSIS — Z12.11 ENCOUNTER FOR SCREENING COLONOSCOPY: Primary | ICD-10-CM

## 2018-02-12 NOTE — TELEPHONE ENCOUNTER
Received call from Keely, care coordinator for patient, requesting that Audie be sent to [facility]    Yael Raymond RN

## 2018-02-14 ENCOUNTER — OFFICE VISIT (OUTPATIENT)
Dept: FAMILY MEDICINE | Facility: CLINIC | Age: 58
End: 2018-02-14
Payer: MEDICARE

## 2018-02-14 VITALS
HEART RATE: 82 BPM | RESPIRATION RATE: 18 BRPM | WEIGHT: 152 LBS | OXYGEN SATURATION: 98 % | BODY MASS INDEX: 19.52 KG/M2 | DIASTOLIC BLOOD PRESSURE: 91 MMHG | TEMPERATURE: 97.4 F | SYSTOLIC BLOOD PRESSURE: 128 MMHG

## 2018-02-14 DIAGNOSIS — Z01.818 PRE-OP EXAM: Primary | ICD-10-CM

## 2018-02-14 DIAGNOSIS — Z00.00 HEALTH CARE MAINTENANCE: ICD-10-CM

## 2018-02-14 DIAGNOSIS — R93.3 ABNORMAL COLONOSCOPY: ICD-10-CM

## 2018-02-14 NOTE — MR AVS SNAPSHOT
After Visit Summary   2018    Delroy Foley    MRN: [id number]           Patient Information     Date Of Birth          1960        Visit Information        Provider Department      2018 1:20 PM Nadya Jay MD [facility]        Today's Diagnoses     Pre-op exam    -  1    Abnormal colonoscopy        Health care maintenance           Follow-ups after your visit        Your next 10 appointments already scheduled     2018   Procedure with Keely Rai MD   [facility], [place], Endoscopy ([facility], [facility])    [address] [address] [phone]           The [facility] is located on the corner of [address] and [address] on the [facility]. It is easily accessible from virtually any point in the [facility] area, via [employer] and [id number].              Who to contact     Please call your clinic at [phone] to:    Ask questions about your health    Make or cancel appointments    Discuss your medicines    Learn about your test results    Speak to your doctor            Additional Information About Your Visit        MyChart Information     [email or web address] is an electronic gateway that provides easy, online access to your medical records. With [email or web address], you can request a clinic appointment, read your test results, renew a prescription or communicate with your care team.     To sign up for [email or web address] visit the website at [email or web address]   You will be asked to enter the access code listed below, as well as some personal information. Please follow the directions to create your username and password.     Your access code is: [email or web address]  Expires: 2018  2:07 PM     Your access code will  in 90 days. If you need help or a new code, please contact your [facility] or call [phone] for assistance.        Care  EveryWhere ID     This is your Care EveryWhere ID. This could be used by other organizations to access your [place] medical records  [id number]        Your Vitals Were     Pulse Temperature Respirations Pulse Oximetry BMI (Body Mass Index)       82 97.4  F (36.3  C) (Oral) 18 98% 19.52 kg/m2        Blood Pressure from Last 3 Encounters:   02/14/18 (!) 128/91   01/11/18 (!) 147/107   01/02/18 (!) 166/101    Weight from Last 3 Encounters:   02/14/18 152 lb (68.9 kg)   01/11/18 138 lb (62.6 kg)   01/02/18 154 lb 3.2 oz (69.9 kg)              We Performed the Following     ADMIN VACCINE, INITIAL     FLU VAC QUADRIVLENT SPLIT VIRUS IM 0.5ml dosage        Primary Care Provider Office Phone # Fax #    Nadya MD Pierre [phone]       [facility] [address] [place] [address]        Equal Access to Services     [facility]: Hadii aad ku hadasho Soomaali, waaxda luqadaha, qaybta kaalmada adeegyada, waxay radhain haycrissn harmeet villalta . So [facility] [phone].    ATENCIÓN: Si habla español, tiene a mackey disposición servicios gratuitos de asistencia lingüística. [facility] [phone].    We comply with applicable federal civil rights laws and [place] laws. We do not discriminate on the basis of race, color, national origin, age, disability, sex, sexual orientation, or gender identity.            Thank you!     Thank you for choosing [facility]  for your care. Our goal is always to provide you with excellent care. Hearing back from our patients is one way we can continue to improve our services. Please take a few minutes to complete the written survey that you may receive in the mail after your visit with us. Thank you!             Your Updated Medication List - Protect others around you: Learn how to safely use, store and throw away your medicines at www.disposemymeds.org.          This list is accurate as of 2/14/18 11:59 PM.  Always use your most  recent med list.                   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        MULTIVITAMINS PO      Take 1 tablet by mouth daily.        OLANZapine 20 MG tablet    zyPREXA    60 tablet    Take 2 tablets (40 mg) by mouth At Bedtime Please see MD before next refill    Paranoid schizophrenia, chronic condition (H)       VIAGRA 25 MG tablet   Generic drug:  sildenafil      Take 1 tablet by mouth as needed. 1 hour before needed

## 2018-02-14 NOTE — PROGRESS NOTES
[facility]  [address] [place] [address],  [address]  [facility] [address]  Phone: [phone]  Fax: [phone]    2/14/2018    Adult PRE-OP Evaluation:    Delroy Foley, 1960 presents for pre-operative evaluation and assessment as requested by Dr. Rai, prior to undergoing surgery/procedure.    Proposed procedure: Colonoscopy  Screening colonoscopy. H/o villous adenoma on colonoscopy in 2013. Was recommended to have follow up colonoscopy in 6 months but did not follow up. Colonoscopy was attempted on 1/11/2018 but prep of the colon was inadequate; there was stool in the entire examined colon and recommendation to repeat colonoscopy was made. Pt denies bright red blood in stool or melena.     Date of Surgery/ Procedure: 2/22/2018  Hospital/Surgical Facility:    [facility] Pre-Ad[facility]      [address]      Fax: [phone]      Phone: [phone]    [facility]-Ad[facility]      [address]      Fax: [phone]      Phone: [phone]     Primary Physician: Nadya Jay  Type of Anesthesia Anticipated: Sedation  History of anesthesia complications: NONE  History of  abnormal bleeding: NONE   History of blood transfusions: NO  Patient has a Health Care Directive or Living Will:  NO    Preoperative Questions   1. NO - Do you have a history of heart attack, stroke, stent, bypass or surgery on an artery in the head, neck, heart or legs?  2. NO - Do you ever have any pain or discomfort in your chest?  3. NO - Have you ever had a severe pain across the front of your chest lasting for half an hour or more?  4. NO - Do you have a history of Congestive Heart Failure?  5. NO - Are you troubled by shortness of breath when: walking on the level/ up a slight hill/ at night?  6. NO - Does your chest ever sound wheezy or whistling?  7. NO - Do you currently have a cold, bronchitis or other respiratory infection?  8. NO - Have you had a cold, bronchitis or other respiratory infection within  the last 2 weeks?  9. NO - Do you usually have a cough?  10. NO - Do you sometimes get pains in the calves of your legs when you walk?  11. NO - Do you or anyone in your family have previous history of blood clots?  12. NO - Do you or does anyone in your family have a serious bleeding problem such as prolonged bleeding following surgeries or cuts?  13. NO - Have you ever had problems with anemia or been told to take iron pills?  14. NO - Have you had any abnormal blood loss such as black, tarry or bloody stools, or abnormal vaginal bleeding?  15. NO - Have you ever had a blood transfusion?  16. NO - Have you or any of your relatives ever had problems with anesthesia?  17. NO - Do you have sleep apnea, excessive snoring or daytime drowsiness?  18. NO - Do you have any prosthetic heart valves?  19. NO - Do you have prosthetic joints?  20. NO - Is there any chance that you may be pregnant?    Patient Active Problem List   Diagnosis     Chronic kidney disease, stage II (mild)     Essential hypertension with goal blood pressure less than 140/90     Schizoaffective disorder (H)     Dysesthesia     Abnormal colonoscopy     Diverticulosis of sigmoid colon     ACP (advance care planning)     S/P catheter ablation of slow pathway     SVT (supraventricular tachycardia) (H)     Hepatitis C virus infection without hepatic coma, unspecified chronicity     Tardive dyskinesia     Vision loss of right eye         Current Outpatient Prescriptions on File Prior to Visit:  cholecalciferol (VITAMIN D3) 31852 UNITS capsule Take 1 capsule (50,000 Units) by mouth once a week   amLODIPine (NORVASC) 5 MG tablet Take 1 tablet (5 mg) by mouth daily   OLANZapine (ZYPREXA) 20 MG tablet Take 2 tablets (40 mg) by mouth At Bedtime Please see MD before next refill   clonazePAM (KLONOPIN) 0.5 MG tablet Take 1 tab po q AM and take 2 tabs po q HS.   benztropine (COGENTIN) 2 MG tablet Take 1 tablet (2 mg) by mouth daily as needed   divalproex (DEPAKOTE  ER) 500 MG 24 hr tablet Take 4 tablets (2,000 mg) by mouth At Bedtime   acetaminophen (TYLENOL) 500 MG tablet Take 2 tablets (1,000 mg) by mouth every 6 hours as needed for mild pain   aspirin 81 MG tablet Take 1 tablet (81 mg) by mouth daily   hydrOXYzine (ATARAX) 25 MG tablet Take 25 mg by mouth 2 times daily.   Multiple Vitamin (MULTIVITAMINS PO) Take 1 tablet by mouth daily.   sildenafil (VIAGRA) 25 MG tablet Take 1 tablet by mouth as needed. 1 hour before needed     No current facility-administered medications on file prior to visit.     OTC products: None, except as noted above    Allergies   Allergen Reactions     Haldol [Haloperidol]      Throat swelling     Risperdal [Risperidone]      Throat swelling     Latex Allergy: NO    Social History     Social History     Marital status: Single     Spouse name: N/A     Number of children: 0     Years of education: N/A     Social History Main Topics     Smoking status: Current Every Day Smoker     Packs/day: 0.50     Years: 10.00     Types: Cigarettes     Smokeless tobacco: Never Used     Alcohol use No     Drug use: No     Sexual activity: No     Other Topics Concern     Caffeine Concern Yes     Social History Narrative    Goes to Adult Day Care     Lives alone       REVIEW OF SYSTEMS:   Constitutional, HEENT, cardiovascular, pulmonary, GI, , musculoskeletal, neuro, skin, endocrine and psych systems are negative, except as otherwise noted.    EXAM:   BP (!) 128/91  Pulse 82  Temp 97.4  F (36.3  C) (Oral)  Resp 18  Wt 152 lb (68.9 kg)  SpO2 98%  BMI 19.52 kg/m2  GENERAL: healthy, alert and no distress  EYES: Eyes grossly normal to inspection  RESP: lungs clear to auscultation - no rales, no rhonchi, no wheezes  CV: regular rates and rhythm, normal S1 S2, no S3 or S4 and no murmur  ABDOMEN: soft, no tenderness, non distended  MS: extremities- no gross deformities noted, no edema  PSYCH: Alert and oriented times 3; speech- coherent , normal rate and  volume    DIAGNOSTICS:      No labs or EKG required for low risk surgery (cataract, skin procedure, breast biopsy, etc)    RISK ASSESSMENT:     Cardiovascular Risk:  -Patient is able to participate in strenuous activities without chest pain.  -The patient does not have chest pain at rest or with exertion   -Patient does not have a history of congestive heart failure.    -The patient does not have a history of stroke and does not have a history of valvular disease.  -Pt does have a h/o SVT s/p ablation. HR normal today at 82. EKG not required.     Pulmonary Risk:  -In terms of risk factors for pulmonary complication, the patient is an everyday smoker, 6-7 cigarettes per day    Perioperative Complications:  -The patient does not have a history of bleeding or clotting problems in the past.    -The patient has not had surgery previously.    -The patient does not have a family history of any anesthesia or surgical complications.      IMPRESSION:   Reason for surgery/procedure: Screening colonoscopy    The proposed surgical procedure is considered LOW risk.    For above listed surgery and anesthesia:   Patient is at LOW risk for surgery/procedure and perioperative/procedure complications.    RECOMMENDATIONS:     Labs:  No labs required    Fasting:  NPO 2 hours prior to surgery.   Clear liquids the day prior to colonoscopy with no red colored liquids    Preop Plan:  --Patient is currently taking    Anticoagulant or Antiplatelet Medication Use  ASPIRIN: should be held for 7 days prior to surgery      Medications:  Patient should take their regular medications the morning of surgery unless otherwise instructed.    Hold aspirin 7 days prior to surgery.    Poonam Jay MD  Family Medicine PGY2    Please contact our office if there are any further questions or information required about this patient.

## 2018-02-14 NOTE — PROGRESS NOTES
Preceptor Attestation:   Patient seen and discussed with the resident.   Assessment and plan reviewed with resident and agreed upon.  Supervising Physician:  Sonny Remy MD  [facility] Medicine

## 2018-02-22 ENCOUNTER — SURGERY (OUTPATIENT)
Age: 58
End: 2018-02-22

## 2018-02-22 ENCOUNTER — ANESTHESIA (OUTPATIENT)
Dept: GASTROENTEROLOGY | Facility: CLINIC | Age: 58
End: 2018-02-22
Payer: MEDICARE

## 2018-02-22 ENCOUNTER — ANESTHESIA EVENT (OUTPATIENT)
Dept: GASTROENTEROLOGY | Facility: CLINIC | Age: 58
End: 2018-02-22
Payer: MEDICARE

## 2018-02-22 ENCOUNTER — HOSPITAL ENCOUNTER (OUTPATIENT)
Facility: CLINIC | Age: 58
Discharge: NURSING, REHAB OR LONG-TERM CARE | End: 2018-02-22
Attending: INTERNAL MEDICINE | Admitting: INTERNAL MEDICINE
Payer: MEDICARE

## 2018-02-22 VITALS
BODY MASS INDEX: 18.69 KG/M2 | HEART RATE: 72 BPM | WEIGHT: 141 LBS | OXYGEN SATURATION: 100 % | HEIGHT: 73 IN | DIASTOLIC BLOOD PRESSURE: 104 MMHG | TEMPERATURE: 97.4 F | RESPIRATION RATE: 20 BRPM | SYSTOLIC BLOOD PRESSURE: 159 MMHG

## 2018-02-22 LAB — COLONOSCOPY: NORMAL

## 2018-02-22 PROCEDURE — 44394 COLONOSCOPY W/SNARE: CPT | Performed by: INTERNAL MEDICINE

## 2018-02-22 PROCEDURE — 45385 COLONOSCOPY W/LESION REMOVAL: CPT | Mod: PT | Performed by: INTERNAL MEDICINE

## 2018-02-22 PROCEDURE — 37000009 ZZH ANESTHESIA TECHNICAL FEE, EACH ADDTL 15 MIN: Performed by: INTERNAL MEDICINE

## 2018-02-22 PROCEDURE — 25000125 ZZHC RX 250: Performed by: NURSE ANESTHETIST, CERTIFIED REGISTERED

## 2018-02-22 PROCEDURE — 25000128 H RX IP 250 OP 636: Performed by: INTERNAL MEDICINE

## 2018-02-22 PROCEDURE — 37000008 ZZH ANESTHESIA TECHNICAL FEE, 1ST 30 MIN: Performed by: INTERNAL MEDICINE

## 2018-02-22 PROCEDURE — 25000128 H RX IP 250 OP 636: Performed by: NURSE ANESTHETIST, CERTIFIED REGISTERED

## 2018-02-22 PROCEDURE — 25000132 ZZH RX MED GY IP 250 OP 250 PS 637: Mod: GY | Performed by: INTERNAL MEDICINE

## 2018-02-22 PROCEDURE — 88305 TISSUE EXAM BY PATHOLOGIST: CPT | Performed by: INTERNAL MEDICINE

## 2018-02-22 RX ORDER — PROPOFOL 10 MG/ML
INJECTION, EMULSION INTRAVENOUS PRN
Status: DISCONTINUED | OUTPATIENT
Start: 2018-02-22 | End: 2018-02-22

## 2018-02-22 RX ORDER — NALOXONE HYDROCHLORIDE 0.4 MG/ML
.1-.4 INJECTION, SOLUTION INTRAMUSCULAR; INTRAVENOUS; SUBCUTANEOUS
Status: DISCONTINUED | OUTPATIENT
Start: 2018-02-22 | End: 2018-02-22 | Stop reason: HOSPADM

## 2018-02-22 RX ORDER — ONDANSETRON 2 MG/ML
4 INJECTION INTRAMUSCULAR; INTRAVENOUS EVERY 30 MIN PRN
Status: DISCONTINUED | OUTPATIENT
Start: 2018-02-22 | End: 2018-02-22 | Stop reason: HOSPADM

## 2018-02-22 RX ORDER — LABETALOL HYDROCHLORIDE 5 MG/ML
10 INJECTION, SOLUTION INTRAVENOUS
Status: DISCONTINUED | OUTPATIENT
Start: 2018-02-22 | End: 2018-02-22 | Stop reason: HOSPADM

## 2018-02-22 RX ORDER — SODIUM CHLORIDE, SODIUM LACTATE, POTASSIUM CHLORIDE, CALCIUM CHLORIDE 600; 310; 30; 20 MG/100ML; MG/100ML; MG/100ML; MG/100ML
INJECTION, SOLUTION INTRAVENOUS CONTINUOUS
Status: DISCONTINUED | OUTPATIENT
Start: 2018-02-22 | End: 2018-02-22 | Stop reason: HOSPADM

## 2018-02-22 RX ORDER — ONDANSETRON 2 MG/ML
4 INJECTION INTRAMUSCULAR; INTRAVENOUS
Status: COMPLETED | OUTPATIENT
Start: 2018-02-22 | End: 2018-02-22

## 2018-02-22 RX ORDER — ONDANSETRON 4 MG/1
4 TABLET, ORALLY DISINTEGRATING ORAL EVERY 30 MIN PRN
Status: DISCONTINUED | OUTPATIENT
Start: 2018-02-22 | End: 2018-02-22 | Stop reason: HOSPADM

## 2018-02-22 RX ORDER — FENTANYL CITRATE 50 UG/ML
25-50 INJECTION, SOLUTION INTRAMUSCULAR; INTRAVENOUS
Status: DISCONTINUED | OUTPATIENT
Start: 2018-02-22 | End: 2018-02-22 | Stop reason: HOSPADM

## 2018-02-22 RX ORDER — MEPERIDINE HYDROCHLORIDE 25 MG/ML
12.5 INJECTION INTRAMUSCULAR; INTRAVENOUS; SUBCUTANEOUS
Status: DISCONTINUED | OUTPATIENT
Start: 2018-02-22 | End: 2018-02-22 | Stop reason: HOSPADM

## 2018-02-22 RX ORDER — LIDOCAINE HYDROCHLORIDE 20 MG/ML
INJECTION, SOLUTION INFILTRATION; PERINEURAL PRN
Status: DISCONTINUED | OUTPATIENT
Start: 2018-02-22 | End: 2018-02-22

## 2018-02-22 RX ORDER — ALBUTEROL SULFATE 0.83 MG/ML
2.5 SOLUTION RESPIRATORY (INHALATION) EVERY 4 HOURS PRN
Status: DISCONTINUED | OUTPATIENT
Start: 2018-02-22 | End: 2018-02-22 | Stop reason: HOSPADM

## 2018-02-22 RX ORDER — PROPOFOL 10 MG/ML
INJECTION, EMULSION INTRAVENOUS CONTINUOUS PRN
Status: DISCONTINUED | OUTPATIENT
Start: 2018-02-22 | End: 2018-02-22

## 2018-02-22 RX ORDER — SIMETHICONE
LIQUID (ML) MISCELLANEOUS PRN
Status: DISCONTINUED | OUTPATIENT
Start: 2018-02-22 | End: 2018-02-22 | Stop reason: HOSPADM

## 2018-02-22 RX ADMIN — PROPOFOL 100 MCG/KG/MIN: 10 INJECTION, EMULSION INTRAVENOUS at 09:31

## 2018-02-22 RX ADMIN — Medication 2 ML: at 02:00

## 2018-02-22 RX ADMIN — PROPOFOL 20 MG: 10 INJECTION, EMULSION INTRAVENOUS at 09:35

## 2018-02-22 RX ADMIN — LIDOCAINE HYDROCHLORIDE 80 MG: 20 INJECTION, SOLUTION INFILTRATION; PERINEURAL at 09:30

## 2018-02-22 RX ADMIN — SODIUM CHLORIDE, POTASSIUM CHLORIDE, SODIUM LACTATE AND CALCIUM CHLORIDE: 600; 310; 30; 20 INJECTION, SOLUTION INTRAVENOUS at 09:24

## 2018-02-22 RX ADMIN — ONDANSETRON 4 MG: 2 INJECTION INTRAMUSCULAR; INTRAVENOUS at 09:35

## 2018-02-22 RX ADMIN — PROPOFOL 20 MG: 10 INJECTION, EMULSION INTRAVENOUS at 09:41

## 2018-02-22 NOTE — ANESTHESIA POSTPROCEDURE EVALUATION
Patient: Delroy Foley    Procedure(s):  colonoscopy - Wound Class: II-Clean Contaminated    Diagnosis:Colonoscopy screening   Diagnosis Additional Information: No value filed.    Anesthesia Type:  MAC    Note:  Anesthesia Post Evaluation    Patient location during evaluation: Endoscopy Recovery  Patient participation: Able to fully participate in evaluation  Level of consciousness: awake  Pain management: adequate  Airway patency: patent  Cardiovascular status: acceptable  Respiratory status: acceptable  Hydration status: acceptable  PONV: none     Anesthetic complications: None    Comments: No noted anesthetic complications.  Patient satisfied with anesthetic.          Last vitals:  Vitals:    02/22/18 1031 02/22/18 1040 02/22/18 1050   BP: (!) 129/95 (!) 132/101 (!) 159/104   Pulse:      Resp: 20 21 20   Temp:      SpO2: 91% 100% 100%         Electronically Signed By: Jovan Ferrer MD  February 22, 2018  1:48 PM

## 2018-02-22 NOTE — IP AVS SNAPSHOT
"                  MRN:[id number]                      After Visit Summary   2/22/2018    Delroy Foley    MRN: [id number]           Thank you!     Thank you for choosing [facility] for your care. Our goal is always to provide you with excellent care. Hearing back from our patients is one way we can continue to improve our services. Please take a few minutes to complete the written survey that you may receive in the mail after you visit with us. Thank you!        Patient Information     Date Of Birth          1960        About your hospital stay     You were admitted on:  February 22, 2018 You last received care in the:  [facility], Endoscopy    You were discharged on:  February 22, 2018       Who to Call     For medical emergencies, please call 911.  For non-urgent questions about your medical care, please call your primary care provider or clinic, [phone]  For questions related to your surgery, please call your surgery clinic        Attending Provider     Provider Specialty    Keely Rai MD Internal Medicine       Primary Care Provider Office Phone # Fax #    Nadya MD Pierre [phone]      Pending Results     No orders found from 2/20/2018 to 2/23/2018.            Admission Information     Date & Time Provider Department Dept. Phone    2/22/2018 Keely Rai MD [facility], [facility], Endoscopy [phone]      Your Vitals Were     Blood Pressure Pulse Temperature Respirations Height Weight    132/101 72 97.4  F (36.3  C) (Oral) 21 1.854 m (6' 1\") 64 kg (141 lb)    Pulse Oximetry BMI (Body Mass Index)                100% 18.6 kg/m2          Vestiage Information     Vestiage lets you send messages to your doctor, view your test results, renew your prescriptions, schedule appointments and more. To sign up, go to [email or web address] . Click on \"Log in\" on the left side of the screen, which will take you to the Welcome page. Then click on \"Sign up Now\" on " the right side of the page.     You will be asked to enter the access code listed below, as well as some personal information. Please follow the directions to create your username and password.     Your access code is: [email or web address]  Expires: 2018  2:07 PM     Your access code will  in 90 days. If you need help or a new code, please call your [facility] clinic or [phone].        Care EveryWhere ID     This is your Care EveryWhere ID. This could be used by other organizations to access your [facility] medical records  [id number]        Equal Access to Services     [facility]: Hadii mary jane cazares haddeanna Someagan, washer walton, jenny guerin, jagjit villalta . So [facility] [phone].    ATENCIÓN: Si habla español, tiene a mackey disposición servicios gratuitos de asistencia lingüística. Jania al [phone].    We comply with applicable federal civil rights laws and [place] laws. We do not discriminate on the basis of race, color, national origin, age, disability, sex, sexual orientation, or gender identity.               Review of your medicines      UNREVIEWED medicines. Ask your doctor about these medicines        Dose / Directions    acetaminophen 500 MG tablet   Commonly known as:  TYLENOL   Used for:  Shoulder joint pain, right        Dose:  1000 mg   Take 2 tablets (1,000 mg) by mouth every 6 hours as needed for mild pain   Quantity:  100 tablet   Refills:  0       amLODIPine 5 MG tablet   Commonly known as:  NORVASC   Used for:  Benign essential hypertension        Dose:  5 mg   Take 1 tablet (5 mg) by mouth daily   Quantity:  90 tablet   Refills:  3       aspirin 81 MG tablet   Used for:  Unspecified essential hypertension        Dose:  81 mg   Take 1 tablet (81 mg) by mouth daily   Quantity:  90 tablet   Refills:  3       benztropine 2 MG tablet   Commonly known as:  COGENTIN   Used for:  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   Quantity:  90 tablet   Refills:  0       divalproex sodium extended-release 500 MG 24 hr tablet   Commonly known as:  DEPAKOTE ER   Used for:  Paranoid schizophrenia, chronic condition (H)        Dose:  2000 mg   Take 4 tablets (2,000 mg) by mouth At Bedtime   Quantity:  120 tablet   Refills:  2       hydrOXYzine 25 MG tablet   Commonly known as:  ATARAX        Dose:  25 mg   Take 25 mg by mouth 2 times daily.   Refills:  0       MULTIVITAMINS PO        Dose:  1 tablet   Take 1 tablet by mouth daily.   Refills:  0       OLANZapine 20 MG tablet   Commonly known as:  zyPREXA   Used for:  Paranoid schizophrenia, chronic condition (H)        Dose:  40 mg   Take 2 tablets (40 mg) by mouth At Bedtime Please see MD before next refill   Quantity:  60 tablet   Refills:  0       VIAGRA 25 MG tablet   Generic drug:  sildenafil        Dose:  1 tablet   Take 1 tablet by mouth as needed. 1 hour before needed   Refills:  0                Protect others around you: Learn how to safely use, store and throw away your medicines at www.disposemymeds.org.             Medication List: This is a list of all your medications and when to take them. Check marks below indicate your daily home schedule. Keep this list as a reference.      Medications           Morning Afternoon Evening Bedtime As Needed    acetaminophen 500 MG tablet   Commonly known as:  TYLENOL   Take 2 tablets (1,000 mg) by mouth every 6 hours as needed for mild  pain                                amLODIPine 5 MG tablet   Commonly known as:  NORVASC   Take 1 tablet (5 mg) by mouth daily                                aspirin 81 MG tablet   Take 1 tablet (81 mg) by mouth daily                                benztropine 2 MG tablet   Commonly known as:  COGENTIN   Take 1 tablet (2 mg) by mouth daily as needed                                cholecalciferol 28374 UNITS capsule   Commonly known as:  VITAMIN D3   Take 1 capsule (50,000 Units) by mouth once a week                                clonazePAM 0.5 MG tablet   Commonly known as:  klonoPIN   Take 1 tab po q AM and take 2 tabs po q HS.                                divalproex sodium extended-release 500 MG 24 hr tablet   Commonly known as:  DEPAKOTE ER   Take 4 tablets (2,000 mg) by mouth At Bedtime                                hydrOXYzine 25 MG tablet   Commonly known as:  ATARAX   Take 25 mg by mouth 2 times daily.                                MULTIVITAMINS PO   Take 1 tablet by mouth daily.                                OLANZapine 20 MG tablet   Commonly known as:  zyPREXA   Take 2 tablets (40 mg) by mouth At Bedtime Please see MD before next refill                                VIAGRA 25 MG tablet   Take 1 tablet by mouth as needed. 1 hour before needed   Generic drug:  sildenafil

## 2018-02-22 NOTE — OR NURSING
Procedure: Colonoscopy /c polypectomy (multiple).  Sedation: Anesthesia Assist Sedation  Specimens: See procedure documentation for details.  O2: See Anesthesia Report (EPIC)  Note: Pt tolerated procedure well.   Transport: Pt transferred to Endo Post-procedure via cart. Siderails elevated smith. Accompanied by CRNA.  Report:  Bedside report given upon completion of transport to post-procedure room.  Total sedation time: See Anesthesia Report (EPIC)    Additional Notes: grounding pad Rt hip removed at conclusion of case. Skin intact.     Camila Ulrich RN  [facility]

## 2018-02-22 NOTE — IP AVS SNAPSHOT
[facility], [place], Endoscopy    [address] [address]    Phone:  [phone]                                       After Visit Summary   2/22/2018    Delroy Foley    MRN: [id number]           After Visit Summary Signature Page     I have received my discharge instructions, and my questions have been answered. I have discussed any challenges I see with this plan with the nurse or doctor.    ..........................................................................................................................................  Patient/Patient Representative Signature      ..........................................................................................................................................  Patient Representative Print Name and Relationship to Patient    ..................................................               ................................................  Date                                            Time    ..........................................................................................................................................  Reviewed by Signature/Title    ...................................................              ..............................................  Date                                                            Time

## 2018-02-22 NOTE — ANESTHESIA CARE TRANSFER NOTE
Patient: Delroy Foley    Procedure(s):  colonoscopy - Wound Class: II-Clean Contaminated    Diagnosis: Colonoscopy screening   Diagnosis Additional Information: No value filed.    Anesthesia Type:   MAC     Note:  Airway :Room Air  Patient transferred to:Phase II  Comments: Pt recovering from his mac anesthetic in the endoscopy suite. Pt VSS  Upon arrival to endo. Pt awake, and comfortable. Pt care transferred to receiving RN.       Vitals: (Last set prior to Anesthesia Care Transfer)    CRNA VITALS  2/22/2018 0952 - 2/22/2018 1027      2/22/2018             Resp Rate (observed): (!)  2                Electronically Signed By: CHET Grover CRNA  February 22, 2018  10:27 AM

## 2018-02-22 NOTE — ANESTHESIA PREPROCEDURE EVALUATION
ANESTHESIA PREOP EVALUATION    HPI: Delroy Foley is a 58 year old male who presents for Procedure(s):  colonoscopy - Wound Class: II-Clean Contaminated      No personal or family h/o anesthesia problems.  Tolerated last anesthetic for colonoscopy with MAC/deep sedation with propofol gtt without issue.      PMHx/PSHx/ROS:  Past Medical History:   Diagnosis Date     Arrhythmia     SVT     Chronic kidney disease, stage II (mild)      Paroxysmal supraventricular tachycardia (H)      Unspecified essential hypertension        Past Surgical History:   Procedure Laterality Date     COLONOSCOPY  2013     COLONOSCOPY N/A 1/11/2018    Procedure: COLONOSCOPY;  colonoscopy;  Surgeon: Keely Rai MD;  Location:  GI     EP ABLATION / EP STUDIES  9/17/15    attempted SVT ablation, unable to induce       Soc Hx:   Social History   Substance Use Topics     Smoking status: Current Every Day Smoker     Packs/day: 0.50     Years: 10.00     Types: Cigarettes     Smokeless tobacco: Never Used     Alcohol use No       Allergies:   Allergies   Allergen Reactions     Haldol [Haloperidol]      Throat swelling     Risperdal [Risperidone]      Throat swelling       Meds:     No current facility-administered medications on file prior to encounter.   Current Outpatient Prescriptions on File Prior to Encounter:  cholecalciferol (VITAMIN D3) 90474 UNITS capsule Take 1 capsule (50,000 Units) by mouth once a week   amLODIPine (NORVASC) 5 MG tablet Take 1 tablet (5 mg) by mouth daily   OLANZapine (ZYPREXA) 20 MG tablet Take 2 tablets (40 mg) by mouth At Bedtime Please see MD before next refill   clonazePAM (KLONOPIN) 0.5 MG tablet Take 1 tab po q AM and take 2 tabs po q HS.   benztropine (COGENTIN) 2 MG tablet Take 1 tablet (2 mg) by mouth daily as needed   divalproex (DEPAKOTE ER) 500 MG 24 hr tablet Take 4 tablets (2,000 mg) by mouth At Bedtime   acetaminophen (TYLENOL) 500 MG tablet Take 2 tablets (1,000 mg) by mouth every  6 hours as needed for mild pain   aspirin 81 MG tablet Take 1 tablet (81 mg) by mouth daily   hydrOXYzine (ATARAX) 25 MG tablet Take 25 mg by mouth 2 times daily.   Multiple Vitamin (MULTIVITAMINS PO) Take 1 tablet by mouth daily.   sildenafil (VIAGRA) 25 MG tablet Take 1 tablet by mouth as needed. 1 hour before needed       Labs:    Blood Bank:  Lab Results   Component Value Date    ABO A 09/17/2015    RH  Pos 09/17/2015    AS Neg 09/17/2015     BMP:  Recent Labs   Lab Test  07/14/16   1430   NA  146.0*   POTASSIUM  4.1   CHLORIDE  106.6   CO2  27.0   BUN  10.0   CR  1.2   GLC  89.7   СВЕТЛАНА  9.3     CBC:   Recent Labs   Lab Test  07/14/16   1430  09/17/15   1401   WBC   --   7.2   RBC   --   3.57*   HGB  13.6  11.5*   HCT  43.1  34.9*   MCV  99.9  98   MCH  31.6  32.2   MCHC  31.6*  33.0   RDW   --   15.8*   PLT   --   241     Coags:  Recent Labs   Lab Test  09/17/15   0642   INR  1.00   PTT  30         ECG results from 09/17/15  -EKG 12-lead, tracing only    Value   Interpretation ECG Click View Image link to view waveform and result   -EKG 12-lead, tracing only    Value   Interpretation ECG Click View Image link to view waveform and result     No results found for this or any previous visit (from the past 8760 hour(s)).        Physical Exam      Airway   Mallampati: I  TM distance: >3 FB  Neck ROM: full    Dental   Comment: edentulous    Cardiovascular   Rhythm and rate: regular and normal      Pulmonary    breath sounds clear to auscultation                    Anesthesia Plan      History & Physical Review  History and physical reviewed and following examination; no interval change.    ASA Status:  2 .    NPO Status:  > 8 hours    Plan for MAC with Propofol induction. Maintenance will be TIVA.  Reason for MAC:  Deep or markedly invasive procedure (G8)    ______________________________________________________________________  Discussed smoking cessation and medical and anesthesia benefits of cessaiton with pt - he  is not interested in quitting.  I discussed the risks and benefits of MAC with deep sedation with the patient.  Questions were sought and answered.      Jovan Ferrer MD  Attending Anesthesiologist        Postoperative Care  Postoperative pain management:  IV analgesics and Oral pain medications.      Consents  Anesthetic plan, risks, benefits and alternatives discussed with:  Patient..

## 2018-02-22 NOTE — LETTER
February 27, 2018       TO: Delroy Talbert  [address]  [place] [address]       Dear Mr. Talbert,    We are writing to inform you of your test results.    The polyp tissue removed from your colon showed no evidence of cancer, but was identified as adenomatous.  These types of polyps can progress to cancer if left in the colon.    Although your polyp tissue was completely removed, we know that you may develop more polyps in the future.  The results and recommendations regarding a follow-up colonoscopy were sent to your primary physician.  He or she will review our recommendation in the context of your other medical problems.  Please remember that our recommendation is only for individuals who have no symptoms.  If you experience a persistent change in bowel habits, or develop new abdominal pain or bleeding in your stools, please consult your primary care physician.    If you have questions, please feel free to make an appointment with your primary care physician or gastroenterology clinic.    Repeat colonoscopy in 3 years.      Resulted Orders   Surgical pathology exam   Result Value Ref Range    Copath Report       Patient Name: DELROY TALBERT  MR#: [id number]  Specimen #: [id number]  Collected: 2/22/2018  Received: 2/22/2018  Reported: 2/25/2018 11:57  Ordering Phy(s): CLINTON BOOGIE    For improved result formatting, select 'View Enhanced Report Format' under   Linked Documents section.    SPECIMEN(S):  A: Cecal polyps  B: Colon polyp, ascending    FINAL DIAGNOSIS:  A. Cecal polyps x2:  - Tubulovillous adenoma x1  - Villous adenoma x1  - No evidence of high-grade dysplasia or malignancy    B. Colon polyp, ascending:  - Tubulovillous adenoma  - No evidence of high-grade dysplasia or malignancy    I have personally reviewed all specimens and/or slides, including the   listed special stains, and used them  with my medical judgement to determine or confirm the final  "diagnosis.    Electronically signed out by:    Marga Vyas M.D., [facility]    CLINICAL HISTORY:  Screening.  GROSS:  A:  The specimen is received in formalin with proper patient   identification, labeled \"cecal polyp  x2 removed  via hot snare-large polyp cut in half to get through channel\".  The   specimen consists of two pink-tan soft  tissues measuring 0.6 cm each consistent with a previously bisected polyp.    The probable resection margin is  inked.  Also present in the specimen container is a 0.5 cm pink-tan soft   tissue.  The specimen is entirely  submitted.    Summary of Sections:  A1 - bisected polyp  A2 - additional polyp    B:  The specimen is received in formalin with proper patient   identification, labeled \"ascending colon polyp  removed via hot snare\".  The specimen consists of a 0.6 x 0.5 x 0.2 cm   pink-tan polyp.  The probable resection  margin is inked black, and the specimen is serially sectioned and entirely   submitted in cassette B1. (Dictated  by: Tiffanie Chao 2/22/2018 12:40 PM)    MICROSCOPIC:  Microscopic examination was performed.    CPT Codes:  A: 02319-NU5  B: 35699-BS5    TESTING LAB LOCATION:  [facility], [facility] [address]  [place]   [address]  [phone]    COLLECTION SITE:  Client: [facility]  Location: [facility] (B)             It was a pleasure to see you at your recent visit. Please let me know if you have any questions or concerns.     Clinic Staff - [phone] option 3     Sincerely,     Keely Rai MD  [address] [place] [address], Mail [id number]  [place]  [address].        "

## 2018-02-25 LAB — COPATH REPORT: NORMAL

## 2018-03-28 ENCOUNTER — TRANSFERRED RECORDS (OUTPATIENT)
Dept: HEALTH INFORMATION MANAGEMENT | Facility: CLINIC | Age: 58
End: 2018-03-28

## 2018-05-21 ENCOUNTER — OFFICE VISIT (OUTPATIENT)
Dept: FAMILY MEDICINE | Facility: CLINIC | Age: 58
End: 2018-05-21
Payer: MEDICARE

## 2018-05-21 VITALS
TEMPERATURE: 97.8 F | WEIGHT: 148.2 LBS | DIASTOLIC BLOOD PRESSURE: 100 MMHG | SYSTOLIC BLOOD PRESSURE: 146 MMHG | OXYGEN SATURATION: 97 % | BODY MASS INDEX: 19.55 KG/M2 | HEART RATE: 95 BPM | RESPIRATION RATE: 18 BRPM

## 2018-05-21 DIAGNOSIS — I10 BENIGN ESSENTIAL HYPERTENSION: ICD-10-CM

## 2018-05-21 DIAGNOSIS — Z00.00 PREVENTATIVE HEALTH CARE: ICD-10-CM

## 2018-05-21 DIAGNOSIS — F25.9 SCHIZOAFFECTIVE DISORDER, UNSPECIFIED TYPE (H): Primary | ICD-10-CM

## 2018-05-21 LAB — VALPROATE SERPL-MCNC: 83 MG/L (ref 50–100)

## 2018-05-21 RX ORDER — AMLODIPINE BESYLATE 5 MG/1
5 TABLET ORAL DAILY
Qty: 90 TABLET | Refills: 3 | Status: SHIPPED | OUTPATIENT
Start: 2018-05-21 | End: 2019-05-14

## 2018-05-21 ASSESSMENT — ENCOUNTER SYMPTOMS
SHORTNESS OF BREATH: 0
FEVER: 0
CHILLS: 0
HEADACHES: 0
BLOOD IN STOOL: 0

## 2018-05-21 NOTE — LETTER
May 22, 2018      Delroy Foley  [address]  [place] [address]        Dear Delroy,    Thank you for getting your care at [facility]. Please see below for your test results.    Resulted Orders   Valproic acid   Result Value Ref Range    Valproic Acid Level 83 50 - 100 mg/L       If you have any concerns about these results please call and leave a message for me.    Sincerely,      Poonam Jay MD

## 2018-05-21 NOTE — PROGRESS NOTES
Preceptor Attestation:   Patient seen, evaluated and discussed with the resident.   I have verified the content of the note, which accurately reflects my assessment of the patient and the plan of care.   Supervising Physician:  Sonny Remy MD

## 2018-05-21 NOTE — PROGRESS NOTES
HPI:       Delroy Foley is a 58 year old who presents for the following  Patient presents with:  RECHECK: blood draw for Depaote levels. Send results to his Psychiatrist. Fax: [phone] to [facility]    Last saw psychiatrist, Dr. Wilcox, about 2-3 weeks ago who requests a depakote level drawn. Results to be faxed to [facility] at [phone]. Patient reports he is doing well with his psychiatric medications.     Additional concerns:  Had colonoscopy completed 2/22/2018: 3 medium polyps in ascending colon and cecum, removed. Path showing tubulovillous adenoma and villous adenoma. Results discussed with patient.     Blood pressure elevated in clinic today and has been elevated during clinic visit in past. Has not been taking amlodipine for a long time he reports. Denies headaches, chest pain, SOB, leg pain with walking, vision changes.     Problem, Medication and Allergy Lists were reviewed and are current.  Patient is an established patient of this clinic.         Review of Systems:   Review of Systems   Constitutional: Negative for chills and fever.   Eyes: Negative for visual disturbance.   Respiratory: Negative for shortness of breath.    Cardiovascular: Negative for chest pain.   Gastrointestinal: Negative for blood in stool.   Neurological: Negative for headaches.             Physical Exam:     Patient Vitals for the past 24 hrs:   BP Temp Temp src Pulse Resp SpO2 Weight   05/21/18 1306 (!) 146/100 - - - - - -   05/21/18 1305 (!) 153/104 97.8  F (36.6  C) Oral 95 18 97 % 148 lb 3.2 oz (67.2 kg)     Body mass index is 19.55 kg/(m^2).  Vitals were reviewed and were normal     Physical Exam   Constitutional: No distress.   Cardiovascular: Normal rate, regular rhythm and normal heart sounds.    Pulmonary/Chest: Effort normal and breath sounds normal. No respiratory distress. He has no wheezes. He has no rales.   Neurological:   Tardive dyskinesia: protruding and twisting  motions of tongue   Skin: He is not diaphoretic.   Psychiatric: He has a normal mood and affect. His speech is normal.       Results:     Results for orders placed or performed during the hospital encounter of 02/22/18   COLONOSCOPY   Result Value Ref Range    COLONOSCOPY       [facility] [place]  [address] [place] [address] [phone]     Endoscopy Department  _______________________________________________________________________________  Patient Name: Delroy Foley           Procedure Date: 2/22/2018 9:18 AM  MRN: [id number]                       Account Number: [id number]  YOB: 1960              Admit Type: Outpatient  Age: 58                               Room: [facility]  Gender: Male                          Note Status: Finalized  Attending MD: Keely Rai MD  Total Sedation Time:   _______________________________________________________________________________     Procedure:           Colonoscopy  Indications:         Screening for colorectal malignant neoplasm  Providers:           Keely Rai MD, Camila Messer RN  Referring MD:        Ced Edwards MD  Medicines:           Monitored Anesthesia Care  Complications:       No immediate complications.  __________________________________ _____________________________________________  Procedure:           Pre-Anesthesia Assessment:                       - Prior to the procedure, a History and Physical was                        performed, and patient medications and allergies were                        reviewed. The patient is competent. The risks and                        benefits of the procedure and the sedation options and                        risks were discussed with the patient. All questions                        were answered and informed consent was obtained. Patient                        identification and proposed procedure were verified by                        the physician  in the procedure room. Mental Status                        Examination: alert and oriented. Airway Examination:                        normal oropharyngeal airway and neck mobility.                        Respiratory Examination: clear to auscultation. CV                        Examination: normal. Prophylactic Antibiotics: The                         patient does not require prophylactic antibiotics. Prior                        Anticoagulants: The patient has taken no previous                        anticoagulant or antiplatelet agents. ASA Grade                        Assessment: III - A patient with severe systemic                        disease. After reviewing the risks and benefits, the                        patient was deemed in satisfactory condition to undergo                        the procedure. The anesthesia plan was to use monitored                        anesthesia care (MAC). Immediately prior to                        administration of medications, the patient was                        re-assessed for adequacy to receive sedatives. The heart                        rate, respiratory rate, oxygen saturations, blood                        pressure, adequacy of pulmonary ventilation, and                        response to care were monitored throughout the                        procedure. The  physical status of the patient was                        re-assessed after the procedure.                       After obtaining informed consent, the colonoscope was                        passed under direct vision. Throughout the procedure,                        the patient's blood pressure, pulse, and oxygen                        saturations were monitored continuously. The Colonoscope                        was introduced through the anus and advanced to the                        cecum, identified by appendiceal orifice and ileocecal                        valve. The colonoscopy was performed without  difficulty.                        The patient tolerated the procedure well. The quality of                        the bowel preparation was good.                                                                                   Findings:       Three sessile and semi-pedunculated polyps were found in the ascending        colon and cecum. The polyps were medium in size. These polyps  were        removed with a hot snare. Resection and retrieval were complete.                                                                                   Impression:          - Three medium polyps in the ascending colon and in the                        cecum, removed with a hot snare. Resected and retrieved.  Recommendation:      - Await pathology results.                                                                                     electronically signed by Clinton Boogie  ______________________  Clinton Boogie MD  2/22/2018 11:01:57 AM  I was physically present for the entire viewing portion of the exam.  __________________________  Signature of teaching physician  Justa/Iva Boogie MD  Number of Addenda: 0    Note Initiated On: 2/22/2018 9:18 AM  Scope In:  Scope Out:     Surgical pathology exam   Result Value Ref Range    Copath Report       Patient Name: JUANJO TALBERT  MR#: [id number]  Specimen #: [id number]  Collected: 2/22/2018  Received: 2/22/2018  Reported: 2/25/2018 11:57  Ordering Phy(s): CLINTON BOOGIE    For improved result formatting, select 'View Enhanced Report Format' under   Linked Documents section.    SPECIMEN(S):  A: Cecal polyps  B: Colon polyp, ascending    FINAL DIAGNOSIS:  A. Cecal polyps x2:  - Tubulovillous adenoma x1  - Villous adenoma x1  - No evidence of high-grade dysplasia or malignancy    B. Colon polyp, ascending:  - Tubulovillous adenoma  - No evidence of high-grade dysplasia or malignancy    I have personally reviewed all specimens and/or slides, including the  "  listed special stains, and used them  with my medical judgement to determine or confirm the final diagnosis.    Electronically signed out by:    Marga Vyas M.D., [facility]    CLINICAL HISTORY:  Screening.  GROSS:  A:  The specimen is received in formalin with proper patient   identification, labeled \"cecal polyp  x2 removed  via hot snare-large polyp cut in half to get through channel\".  The   specimen consists of two pink-tan soft  tissues measuring 0.6 cm each consistent with a previously bisected polyp.    The probable resection margin is  inked.  Also present in the specimen container is a 0.5 cm pink-tan soft   tissue.  The specimen is entirely  submitted.    Summary of Sections:  A1 - bisected polyp  A2 - additional polyp    B:  The specimen is received in formalin with proper patient   identification, labeled \"ascending colon polyp  removed via hot snare\".  The specimen consists of a 0.6 x 0.5 x 0.2 cm   pink-tan polyp.  The probable resection  margin is inked black, and the specimen is serially sectioned and entirely   submitted in cassette B1. (Dictated  by: Tiffanie Choa 2/22/2018 12:40 PM)    MICROSCOPIC:  Microscopic examination was performed.    CPT Codes:  A: 28662-KS8  B: 36814-TB8    TESTING LAB LOCATION:  [facility], [address]   [address] [phone]    COLLECTION SITE:  Client: [facility]  Location: [facility] ([facility])           Assessment and Plan     1. Schizoaffective disorder, unspecified type (H)  Will check valproic acid level and fax results to Dr. Wilcox at fax number listed above. Dr. Wilcox is managing all psychiatric medications for Delroy.  - Valproic acid    2. Benign essential hypertension  Discussed re-starting amlodipine today as patient's BP has been elevated on at least 2 separate occasions. He is agreeable to restart today. Will follow up in 2 weeks for " BP recheck.    - amLODIPine (NORVASC) 5 MG tablet; Take 1 tablet (5 mg) by mouth daily  Dispense: 90 tablet; Refill: 3    3. Preventative health care  Colonoscopy results discussed. Reviewed screening interval recommendations based on results of scope and path and discussed with Delroy. Next colonoscopy in 3 years.     Medications Discontinued During This Encounter   Medication Reason     amLODIPine (NORVASC) 5 MG tablet Reorder     Options for treatment and follow-up care were reviewed with the patient. Delroy Foley  engaged in the decision making process and verbalized understanding of the options discussed and agreed with the final plan.    Poonam Jay MD  Family Medicine PGY2

## 2018-05-21 NOTE — MR AVS SNAPSHOT
After Visit Summary   2018    Delroy Foley    MRN: [id number]           Patient Information     Date Of Birth          1960        Visit Information        Provider Department      2018 1:20 PM Nadya Jay MD [facility]        Today's Diagnoses     Schizoaffective disorder, unspecified type (H)    -  1    Benign essential hypertension        Preventative health care           Follow-ups after your visit        Follow-up notes from your care team     Return in about 2 weeks (around 2018) for BP Recheck.      Who to contact     Please call your clinic at [phone] to:    Ask questions about your health    Make or cancel appointments    Discuss your medicines    Learn about your test results    Speak to your doctor            Additional Information About Your Visit        MyChart Information     [employer] is an electronic gateway that provides easy, online access to your medical records. With [employer], you can request a clinic appointment, read your test results, renew a prescription or communicate with your care team.     To sign up for [employer] visit the website at [email or web address]   You will be asked to enter the access code listed below, as well as some personal information. Please follow the directions to create your username and password.     Your access code is: [email or web address]  Expires: 2018  1:42 PM     Your access code will  in 90 days. If you need help or a new code, please contact your [facility] or call [phone] for assistance.        Care EveryWhere ID     This is your Care EveryWhere ID. This could be used by other organizations to access your [place] medical records  [id number]        Your Vitals Were     Pulse Temperature Respirations Pulse Oximetry BMI (Body Mass Index)       95 97.8  F (36.6  C) (Oral) 18 97% 19.55 kg/m2        Blood Pressure from Last 3 Encounters:   18 (!)  146/100   02/22/18 (!) 159/104   02/14/18 (!) 128/91    Weight from Last 3 Encounters:   05/21/18 148 lb 3.2 oz (67.2 kg)   02/22/18 141 lb (64 kg)   02/14/18 152 lb (68.9 kg)              We Performed the Following     Valproic acid          Where to get your medicines      These medications were sent to [facility] [place], [place] - [address], [facility] [address]     Phone:  [phone]     amLODIPine 5 MG tablet          Primary Care Provider Office Phone # Fax #    Nadya MD Pierre [phone]       [facility] [address]   [facility] [address]        Equal Access to Services     CASE COLIN : Ashvin Jimenez, washer luqmulu, qaybta kaalmasri guerin, jagjit villalta . So [facility] [phone].    ATENCIÓN: Si habla español, tiene a mackey disposición servicios gratuitos de asistencia lingüística. Llame al [phone].    We comply with applicable federal civil rights laws and [place] laws. We do not discriminate on the basis of race, color, national origin, age, disability, sex, sexual orientation, or gender identity.            Thank you!     Thank you for choosing [facility]  for your care. Our goal is always to provide you with excellent care. Hearing back from our patients is one way we can continue to improve our services. Please take a few minutes to complete the written survey that you may receive in the mail after your visit with us. Thank you!             Your Updated Medication List - Protect others around you: Learn how to safely use, store and throw away your medicines at www.disposemymeds.org.          This list is accurate as of 5/21/18  1:42 PM.  Always use your most recent med list.                   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        MULTIVITAMINS PO      Take 1 tablet by mouth daily.        OLANZapine 20 MG tablet    zyPREXA    60 tablet    Take 2 tablets (40 mg) by mouth At Bedtime Please see MD before next refill    Paranoid schizophrenia, chronic condition (H)       VIAGRA 25 MG tablet   Generic drug:  sildenafil      Take 1 tablet by mouth as needed. 1 hour before needed

## 2018-06-20 ENCOUNTER — OFFICE VISIT (OUTPATIENT)
Dept: FAMILY MEDICINE | Facility: CLINIC | Age: 58
End: 2018-06-20
Payer: MEDICARE

## 2018-06-20 VITALS
BODY MASS INDEX: 19.13 KG/M2 | HEART RATE: 99 BPM | OXYGEN SATURATION: 100 % | DIASTOLIC BLOOD PRESSURE: 92 MMHG | RESPIRATION RATE: 16 BRPM | TEMPERATURE: 97.5 F | WEIGHT: 145 LBS | SYSTOLIC BLOOD PRESSURE: 130 MMHG

## 2018-06-20 DIAGNOSIS — H61.21 IMPACTED CERUMEN OF RIGHT EAR: ICD-10-CM

## 2018-06-20 DIAGNOSIS — N49.2 NODULE OF SCROTUM: ICD-10-CM

## 2018-06-20 DIAGNOSIS — I10 BENIGN ESSENTIAL HYPERTENSION: ICD-10-CM

## 2018-06-20 DIAGNOSIS — H92.02 LEFT EAR PAIN: Primary | ICD-10-CM

## 2018-06-20 NOTE — MR AVS SNAPSHOT
After Visit Summary   2018    Delroy Foley    MRN: [id number]           Patient Information     Date Of Birth          1960        Visit Information        Provider Department      2018 3:20 PM Chhaya Ahn MD [facility]        Today's Diagnoses     Left ear pain    -  1    Impacted cerumen of right ear        Nodule of scrotum        Benign essential hypertension           Follow-ups after your visit        Follow-up notes from your care team     Return in about 2 weeks (around 2018) for BP Recheck.      Who to contact     Please call your clinic at [phone] to:    Ask questions about your health    Make or cancel appointments    Discuss your medicines    Learn about your test results    Speak to your doctor            Additional Information About Your Visit        MyChart Information     [employer] is an electronic gateway that provides easy, online access to your medical records. With [employer], you can request a clinic appointment, read your test results, renew a prescription or communicate with your care team.     To sign up for [employer] visit the website at [email or web address]   You will be asked to enter the access code listed below, as well as some personal information. Please follow the directions to create your username and password.     Your access code is: [email or web address]  Expires: 2018  1:42 PM     Your access code will  in 90 days. If you need help or a new code, please contact your [facility] or call [phone] for assistance.        Care EveryWhere ID     This is your Care EveryWhere ID. This could be used by other organizations to access your [place] medical records  [id number]        Your Vitals Were     Pulse Temperature Respirations Pulse Oximetry BMI (Body Mass Index)       99 97.5  F (36.4  C) (Oral) 16 100% 19.13 kg/m2        Blood Pressure from Last 3 Encounters:   18 (!)  130/92   05/21/18 (!) 146/100   02/22/18 (!) 159/104    Weight from Last 3 Encounters:   06/20/18 145 lb (65.8 kg)   05/21/18 148 lb 3.2 oz (67.2 kg)   02/22/18 141 lb (64 kg)              We Performed the Following     REMOVE ADRIAN CERMACKN        Primary Care Provider Office Phone # Fax #    Nadya MD Pierre [phone]       [facility] [address]   [facility] [address]        Equal Access to Services     [facility]: Hadii mary jane lambert Someagan, waaxda lucarmenadaha, qaybta kaalmasri guerin, jagjit villalta . So [facility] [phone].    ATENCIÓN: Si habla español, tiene a mackey disposición servicios gratuitos de asistencia lingüística. [facility] [phone].    We comply with applicable federal civil rights laws and [place] laws. We do not discriminate on the basis of race, color, national origin, age, disability, sex, sexual orientation, or gender identity.            Thank you!     Thank you for choosing [facility]  for your care. Our goal is always to provide you with excellent care. Hearing back from our patients is one way we can continue to improve our services. Please take a few minutes to complete the written survey that you may receive in the mail after your visit with us. Thank you!             Your Updated Medication List - Protect others around you: Learn how to safely use, store and throw away your medicines at www.disposemymeds.org.          This list is accurate as of 6/20/18 11:59 PM.  Always use your most recent med list.                   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        MULTIVITAMINS PO      Take 1 tablet by mouth daily.        OLANZapine 20 MG tablet    zyPREXA    60 tablet    Take 2 tablets (40 mg) by mouth At Bedtime Please see MD before next refill    Paranoid schizophrenia, chronic condition (H)       VIAGRA 25 MG tablet   Generic drug:  sildenafil      Take 1 tablet by mouth as needed. 1 hour before needed

## 2018-06-20 NOTE — PROGRESS NOTES
Preceptor Attestation:   Patient seen, evaluated and discussed with the resident. I have verified the content of the note, which accurately reflects my assessment of the patient and the plan of care.   Supervising Physician:  Marleen Lagunas MD

## 2018-06-20 NOTE — PROGRESS NOTES
HPI:       Delroy Foley is a 58 year old who presents for the following  Patient presents with:    Ear Problem: left ear pain started about 6 months ago, per patient he put super glue on his glasses and noticed pain since. Pain is located on the skin between face and ear.     He also has a boil on scrotum: pt noticed a spot on his scrotum that is like a pimple about 2 months ago, per pt is is getting smaller but he thinks it needs to be drained, pt did try to pop it himself however no pus or blood was present.     Problem, Medication and Allergy Lists were reviewed and are current.  Patient is an established patient of this clinic.         Review of Systems:   Review of Systems   HENT: Positive for ear pain.    Genitourinary:        Scrotum lump   All other systems reviewed and are negative.            Physical Exam:   Patient Vitals for the past 24 hrs:   BP Temp Temp src Pulse Resp SpO2 Weight   06/20/18 1524 (!) 130/92 - - - - - -   06/20/18 1521 (!) 145/103 97.5  F (36.4  C) Oral 99 16 100 % 145 lb (65.8 kg)     Body mass index is 19.13 kg/(m^2).  Vital signs normal except elevated blood pressure.     Physical Exam   Constitutional: He appears well-developed.   Disheveled and not pleasant smelling thin male   HENT:   Head: Normocephalic and atraumatic.   Right Ear: External ear normal.   Left Ear: External ear normal.   Nose: Nose normal.   Mouth/Throat: Oropharynx is clear and moist. No oropharyngeal exudate.   Genitourinary:               Results:     No pending results.   Assessment and Plan     1. Left ear pain  - normal physical examination so we reassured the patient and advised to get new glasses    2. Impacted cerumen of right ear  - REMOVE IMPACTED CERUMEN    3. Nodule of scrotum  - there is very small round skin thickening, likely a dermatofibroma  - reassured the patient given that it is already getting smaller    4. HTN, not very well controlled. Patient states that he takes his medications.    - follow up in 1-2 weeks for blood pressure recheck.     There are no discontinued medications.  Options for treatment and follow-up care were reviewed with the patient. Delroy Foley  engaged in the decision making process and verbalized understanding of the options discussed and agreed with the final plan.    Chhaya Ahn MD, PhD  [facility],  PGY-3 Family Medicine Resident  #[id number]

## 2019-01-23 ENCOUNTER — OFFICE VISIT (OUTPATIENT)
Dept: FAMILY MEDICINE | Facility: CLINIC | Age: 59
End: 2019-01-23
Payer: MEDICARE

## 2019-01-23 VITALS
HEIGHT: 73 IN | OXYGEN SATURATION: 98 % | HEART RATE: 89 BPM | RESPIRATION RATE: 16 BRPM | SYSTOLIC BLOOD PRESSURE: 128 MMHG | BODY MASS INDEX: 19.24 KG/M2 | DIASTOLIC BLOOD PRESSURE: 90 MMHG | WEIGHT: 145.2 LBS

## 2019-01-23 DIAGNOSIS — S39.012A STRAIN OF LUMBAR REGION, INITIAL ENCOUNTER: ICD-10-CM

## 2019-01-23 DIAGNOSIS — Z87.891 PERSONAL HISTORY OF TOBACCO USE: ICD-10-CM

## 2019-01-23 DIAGNOSIS — Z00.00 ROUTINE HISTORY AND PHYSICAL EXAMINATION OF ADULT: Primary | ICD-10-CM

## 2019-01-23 DIAGNOSIS — R35.0 URINARY FREQUENCY: ICD-10-CM

## 2019-01-23 LAB
BILIRUBIN UR: NEGATIVE
BLOOD UR: NEGATIVE
BUN SERPL-MCNC: 15 MG/DL (ref 7–21)
CALCIUM SERPL-MCNC: 9.2 MG/DL (ref 8.5–10.1)
CHLORIDE SERPLBLD-SCNC: 105.8 MMOL/L (ref 98–110)
CO2 SERPL-SCNC: 29.8 MMOL/L (ref 20–32)
CREAT SERPL-MCNC: 1.2 MG/DL (ref 0.7–1.3)
GFR SERPL CREATININE-BSD FRML MDRD: 65.9 ML/MIN/1.7 M2
GLUCOSE SERPL-MCNC: 106 MG'DL (ref 70–99)
GLUCOSE URINE: NEGATIVE
KETONES UR QL: NEGATIVE
LEUKOCYTE ESTERASE UR: NEGATIVE
NITRITE UR QL STRIP: NEGATIVE
PH UR STRIP: 7 [PH] (ref 5–7)
POTASSIUM SERPL-SCNC: 3.8 MMOL/DL (ref 3.3–4.5)
PROTEIN UR: NEGATIVE
SODIUM SERPL-SCNC: 141.1 MMOL/L (ref 132.6–141.4)
SP GR UR STRIP: <=1.005
UROBILINOGEN UR STRIP-ACNC: NORMAL

## 2019-01-23 ASSESSMENT — MIFFLIN-ST. JEOR: SCORE: 1524.24

## 2019-01-23 NOTE — LETTER
January 23, 2019        Delroy Foley  [address]  [place] [address]        Dear Delroy,    Thank you for getting your care at [facility]. Please see below for your test results. The results of your urine test is normal, there is no infection. Also, the salts of your body are normal and your kidney is functioning fine.     Resulted Orders   Urinalysis (UA) (\[facility] [place] [facility]\"")   Result Value Ref Range    Specific Gravity Urine <=1.005 1.005 - 1.030    pH Urine 7.0 4.5 - 8.0    Leukocyte Esterase UR Negative NEGATIVE    Nitrite Urine Negative NEGATIVE    Protein UR Negative NEGATIVE    Glucose Urine Negative NEGATIVE    Ketones Urine Negative NEGATIVE    Urobilinogen mg/dL 0.2 E.U./dL 0.2 E.U./dL    Bilirubin UR Negative NEGATIVE    Blood UR Negative NEGATIVE   Basic Metabolic Panel (\[facility] [place] [facility]\"")   Result Value Ref Range    Urea Nitrogen 15.0 7.0 - 21.0 mg/dL    Calcium 9.2 8.5 - 10.1 mg/dL    Chloride 105.8 98.0 - 110.0 mmol/L    Carbon Dioxide 29.8 20.0 - 32.0 mmol/L    Creatinine 1.2 0.7 - 1.3 mg/dL    Glucose 106.0 (H) 70.0 - 99.0 mg'dL    Potassium 3.8 3.3 - 4.5 mmol/dL    Sodium 141.1 132.6 - 141.4 mmol/L    GFR Estimate 65.9 >60.0 mL/min/1.7 m2    GFR Estimate If Black 79.7 >60.0 mL/min/1.7 m2     Sincerely,    Poonam Jay MD

## 2019-01-23 NOTE — PROGRESS NOTES
Preceptor Attestation:   Patient seen, evaluated and discussed with the resident. I have verified the content of the note, which accurately reflects my assessment of the patient and the plan of care.   Supervising Physician:  Any Zheng MD

## 2019-01-23 NOTE — NURSING NOTE
Delroy Foley      1.  Has the patient received the information for the influenza vaccine? YES    2.  Does the patient have any of the following contraindications?     Allergy to eggs? No     Allergic reaction to previous influenza vaccines? No     Any other problems to previous influenza vaccines? No     Paralyzed by Guillain[place] syndrome? No     Currently pregnant? NO     Current moderate or severe illness? No    Vaccination given by OG Higuera.  Recorded by OG Higuera

## 2019-01-23 NOTE — PROGRESS NOTES
Male Physical Note          HPI         Concerns today:   1) Frequent urination. Started 1 month ago. Present during the day. Does not wake up at night to urinate. Denies urgency, dysuria, hematuria, sense of incomplete bladder emptying, weak stream, difficulty starting stream. Drinks about 6 glasses (8oz) per day. Urinates 5-9x/day    2) Low back, was running for a bus 3 weeks ago, strained lower back. Has been improving over the last 3 weeks. No weakness or numbness.     Patient Active Problem List   Diagnosis     Chronic kidney disease, stage II (mild)     Essential hypertension with goal blood pressure less than 140/90     Schizoaffective disorder (H)     Dysesthesia     Abnormal colonoscopy     Diverticulosis of sigmoid colon     ACP (advance care planning)     S/P catheter ablation of slow pathway     SVT (supraventricular tachycardia) (H)     Hepatitis C virus infection without hepatic coma, unspecified chronicity     Tardive dyskinesia     Vision loss of right eye       Past Medical History:   Diagnosis Date     Arrhythmia     SVT     Chronic kidney disease, stage II (mild)      Paroxysmal supraventricular tachycardia (H)      Unspecified essential hypertension         No family history on file.           Review of Systems:     Review of Systems:  CONSTITUTIONAL: NEGATIVE for fever, chills, change in weight  INTEGUMENTARY/SKIN: NEGATIVE for worrisome rashes, moles or lesions  EYES: NEGATIVE for vision changes or irritation  ENT/MOUTH: NEGATIVE for ear, mouth and throat problems  RESP: NEGATIVE for significant cough or SOB  CV: NEGATIVE for chest pain, palpitations or peripheral edema  GI: NEGATIVE for nausea, abdominal pain, heartburn, or change in bowel habits  : NEGATIVE for dysuria, or hematuria. POSITIVE for urinary frequency  MUSCULOSKELETAL: NEGATIVE for significant myalgias. POSITIVE for low back pain  NEURO: NEGATIVE for weakness, dizziness or paresthesias  ENDOCRINE: NEGATIVE for temperature  intolerance, skin/hair changes  HEME/ALLERGY: NEGATIVE for bleeding problems  PSYCHIATRIC: NEGATIVE for changes in mood or affect  Sleep:   Do you snore most or the night (as reported by a family member)? No  Do you feel sleepy or extremely tired during most of the day? No           Social History     Social History     Socioeconomic History     Marital status: Single     Spouse name: Not on file     Number of children: 0     Years of education: Not on file     Highest education level: Not on file   Social Needs     Financial resource strain: Not on file     Food insecurity - worry: Not on file     Food insecurity - inability: Not on file     Transportation needs - medical: Not on file     Transportation needs - non-medical: Not on file   Occupational History     Not on file   Tobacco Use     Smoking status: Current Every Day Smoker     Packs/day: 1.00     Years: 39.00     Pack years: 39.00     Types: Cigarettes     Smokeless tobacco: Never Used   Substance and Sexual Activity     Alcohol use: No     Drug use: No     Sexual activity: No   Other Topics Concern     Parent/sibling w/ CABG, MI or angioplasty before 65F 55M? Not Asked      Service Not Asked     Blood Transfusions Not Asked     Caffeine Concern Yes     Occupational Exposure Not Asked     Hobby Hazards Not Asked     Sleep Concern Not Asked     Stress Concern Not Asked     Weight Concern Not Asked     Special Diet Not Asked     Back Care Not Asked     Exercise Not Asked     Bike Helmet Not Asked     Seat Belt Not Asked     Self-Exams Not Asked   Social History Narrative    Goes to [employer] Care     Lives alone     Smoking history 1 PPD since age 20.     Marital Status:Single    Has anyone hurt you physically, for example by pushing, hitting, slapping or kicking you or forcing you to have sex? Denies  Do you feel threatened or controlled by a partner, ex-partner or anyone in your life? Denies    Sexual Health     Sexual concerns: No   STI History:  "Neg      Recommended Screening     HIV screening:  Date done 4/5/2011  Result(s) negative  Colon CA Screening (>50-75 ):  Date done 2/28/2018  Result(s) 3 medium polyps in the ascending colon and in the cecum. Pathology showed tubulovillous adenoma x2 and villous adenoma x1. Next colonoscopy in 3 years (2/28/2021)         Physical Exam:     Vitals: /90   Pulse 89   Resp 16   Ht 1.849 m (6' 0.8\")   Wt 65.9 kg (145 lb 3.2 oz)   SpO2 98%   BMI 19.26 kg/m    BMI= Body mass index is 19.26 kg/m .     GENERAL: healthy, alert and no distress  EYES: Eyes grossly normal to inspection, extraocular movements - intact, and PERRL  HENT: ear canals- normal; TMs- normal; Nose- normal; Mouth- no ulcers, no lesions  NECK: no tenderness, no adenopathy, no asymmetry, no masses, no stiffness; thyroid- normal to palpation  RESP: lungs clear to auscultation - no rales, no rhonchi, no wheezes  CV: regular rates and rhythm, normal S1 S2, no S3 or S4 and no murmur, no click or rub -  ABDOMEN: soft, no tenderness, no  hepatosplenomegaly, no masses  MS: extremities- no gross deformities noted, no edema. Tender over bilateral lumbar paraspinal muscles. Normal ROM of lumbar spine.   SKIN: no suspicious lesions, no rashes  NEURO: strength and tone- normal, sensory exam- grossly normal, mentation- intact, speech- normal  RECTAL- male: no masses, no hemorhoids, Prostate- symmetric, no  nodularity, no masses, no hypertrophy  PSYCH: Alert and oriented times 3; speech- coherent , normal rate and volume;  no tangential thoughts, no hallucinations or delusions, affect- normal  LYMPHATICS: ant. cervical- normal, post. cervical- normal, supraclavicular- normal    Assessment and Plan        1. Routine history and physical examination of adult  Reviewed USPSTF recommendations.   Offered flu vaccine, accepted.   Discussed shingrix vaccine and provided patient with printed information about vaccine and where he may have this administered. "   Discussed lung cancer screening given patient's smoking history (see below).  - ADMIN VACCINE, INITIAL  - FLU VAC PRESRV FREE QUAD SPLIT VIR IM, 0.5 mL dosage    2. Personal history of tobacco use  Patient has a 39 pack year smoking history and is a current smoker.   Lung Cancer Screening Shared Decision Making Visit     Delroy Foley is eligible for lung cancer screening on the basis of the information provided in my signed lung cancer screening order.     I have discussed with patient the risks and benefits of screening for lung cancer with low-dose CT.     The risks include:  radiation exposure: one low dose chest CT has as much ionizing radiation as about 15 chest x-rays or 6 months of background radiation living in [place]    false positives: 96% of positive findings/nodules are NOT cancer, but some might still require additional diagnostic evaluation, including biopsy  over-diagnosis: some slow growing cancers that might never have been clinically significant will be detected and treated unnecessarily     The benefit of early detection of lung cancer is contingent upon adherence to annual screening or more frequent follow up if indicated.     Furthermore, reaping the benefits of screening requires Delroy Foley to be willing and physically able to undergo diagnostic procedures, if indicated. Although no specific guide is available for determining severity of comorbidities, it is reasonable to withhold screening in patients who have greater mortality risk from other diseases.     We did discuss that the only way to prevent lung cancer is to not smoke. Smoking cessation assistance was offered.    I did not offer risk estimation using a calculator such as this one:    ShouldIScreen    - Prof fee: Shared Decisionmaking for Lung Cancer Screening  - CT Chest Lung Cancer Scrn Low Dose wo; Future  - Okay for Smoking Cessation Study (PLUTO) to Contact Patient    3. Urinary frequency  From history, patient  reports urinating more than usual about 5-9x/day which is not very often and unlikely to quantify as polyuria.   UA to rule out UTI. UA normal. BMP to evaluate for electrolyte abnormalities, results are wnl. Prostate is not enlarged  on rectal exam, no nodules palpated. May be due to overactive bladder or 2/2 bladder irritants. Will continue to monitor.   - Urinalysis (UA) ([facility])  - Basic Metabolic Panel ([facility])    4. Strain of lumbar region, initial encounter  Topical menthol gel for pain relief of lumbar muscle strain.   Back exercises at home as tolerated.   - Menthol, Topical Analgesic, 10 % GEL; Externally apply topically 2 times daily as needed (pain)  Dispense: 70.8 g; Refill: 0      Options for treatment and follow-up care were reviewed with the patient. Delroy Foley and/or guardian engaged in the decision making process and verbalized understanding of the options discussed and agreed with the final plan.    Poonam Jay MD  Family Medicine PGY3 Resident

## 2019-01-23 NOTE — PATIENT INSTRUCTIONS
Preventive Health Recommendations  Male Ages 50 - 64    Yearly exam:             See your health care provider every year in order to  o   Review health changes.   o   Discuss preventive care.    o   Review your medicines if your doctor has prescribed any.     Have a cholesterol test every 5 years, or more frequently if you are at risk for high cholesterol/heart disease.     Have a diabetes test (fasting glucose) every three years. If you are at risk for diabetes, you should have this test more often.     Have a colonoscopy at age 50, or have a yearly FIT test (stool test). These exams will check for colon cancer.      Talk with your health care provider about whether or not a prostate cancer screening test (PSA) is right for you.    You should be tested each year for STDs (sexually transmitted diseases), if you re at risk.     Shots: Get a flu shot each year. Get a tetanus shot every 10 years.     Nutrition:    Eat at least 5 servings of fruits and vegetables daily.     Eat whole-grain bread, whole-wheat pasta and brown rice instead of white grains and rice.     Get adequate Calcium and Vitamin D.     Lifestyle    Exercise for at least 150 minutes a week (30 minutes a day, 5 days a week). This will help you control your weight and prevent disease.     Limit alcohol to one drink per day.     No smoking.     Wear sunscreen to prevent skin cancer.     See your dentist every six months for an exam and cleaning.     See your eye doctor every 1 to 2 years.  Shingles Vaccine    FDA (Food and Drug Administration) recently approved SHINGRIX, a new vaccine for the prevention of shingles. The federal committee of immunization experts voted to recommend Shingrix for all Americans 50 and older. Shingrix is recommended over Zostavax, the existing shingles vaccine. Revaccination with Shingrix is recommended for people who have received Zostavax    Why should I get the shingles vaccine?  Shingles, or zoster, is a condition  caused by a virus that creates a painful rash on your skin. Some people who get shingles have very severe pain, even after the rash is gone. Most people who get shingles are older adults. The shingles vaccine is recommended for adults aged 60 years and older (Zostavax). Getting the vaccine is low risk and will help protect you against shingles.  Studies showed that Zostavax is 51% effective against shingles and 67% effective against postherpetic neuralgia. In contrast, Shingrix is 97% effective against shingles for people between the ages of 50 and 69 and 91% effective for people 70 or older. It is 91% effective against postherpetic neuralgia for people 50 and older.    Will I have to pay for this vaccine?  The shingles vaccine is expensive, but your insurance might cover it. If you aren t sure what is covered for you, we encourage you to call your insurance company and ask them.  If you have Medicaid, or [place]:  Your insurance probably covers this vaccine.  If you have a private health insurance plan:  Your insurance probably covers this vaccine.  If you have Medicare, including Medicare Part D: Your insurance probably covers this vaccine. Depending on your plan, you might need to pay part of the price, like you do for your prescription medications.  If you have Medicare, but you do NOT have Medicare Part D: Your insurance does not cover this vaccine.    How can I get the shingles vaccine?  You can get this vaccine at any pharmacy that offers vaccines, such as a [facility] or [facility]. It helps to call your pharmacy before you go, to make sure they have the vaccine in stock. Simply tell them you want the shingles vaccine. They will be able to help you.      Lung Cancer Screening   Frequently Asked Questions  If you are at high-risk for lung cancer, getting screened with low-dose computed tomography (LDCT) every year can help save your life. This handout offers answers to some of the most common questions about lung cancer  screening. If you have other questions, please call [phone] ([phone]).     What is it?  Lung cancer screening uses special X-ray technology to create an image of your lung tissue. The exam is quick and easy and takes less than 10 seconds. We don t give you any medicine or use any needles. You can eat before and after the exam. You don t need to change your clothes as long as the clothing on your chest doesn t contain metal. But, you do need to be able to hold your breath for at least 6 seconds during the exam.    What is the goal of lung cancer screening?  The goal of lung cancer screening is to save lives. Many times, lung cancer is not found until a person starts having physical symptoms. Lung cancer screening can help detect lung cancer in the earliest stages when it may be easier to treat.    Who should be screened for lung cancer?  We suggest lung cancer screening for anyone who is at high-risk for lung cancer. You are in the high-risk group if you:      are between the ages of 55 and 79, and    have smoked at least 1 pack of cigarettes a day for 30 or more years, and    still smoke or have quit within the past 15 years.    However, if you have a new cough or shortness of breath, you should talk to your doctor before being screened.    Some national lung health advocacy groups also recommend screening for people ages 50 to 79 who have smoked an average of 1 pack of cigarettes a day for 20 years. They must also have at least 1 other risk factor for lung cancer, not including exposure to secondhand smoke. Other risk factors are having had cancer in the past, emphysema, pulmonary fibrosis, COPD, a family history of lung cancer, or exposure to certain materials such as arsenic, asbestos, beryllium, cadmium, chromium, diesel fumes, nickel, radon or silica. Your care team can help you know if you have one of these risk factors.     Why does it matter if I have symptoms?  Certain symptoms can be a  sign that you have a condition in your lungs that should be checked and treated by your doctor. These symptoms include fever, chest pain, a new or changing cough, shortness of breath that you have never felt before, coughing up blood or unexplained weight loss. Having any of these symptoms can greatly affect the results of lung cancer screening.       Should all smokers get an LDCT lung cancer screening exam?  It depends. Lung cancer screening is for a very specific group of men and women who have a history of heavy smoking over a long period of time (see  Who should be screened for lung cancer  above).  I am in the high-risk group, but have been diagnosed with cancer in the past. Is LDCT lung cancer screening right for me?  In some cases, you should not have LDCT lung screening, such as when your doctor is already following your cancer with CT scan studies. Your doctor will help you decide if LDCT lung screening is right for you.  Do I need to have a screening exam every year?  Yes. If you are in the high-risk group described earlier, you should get an LDCT lung cancer screening exam every year until you are 79, or are no longer willing or able to undergo screening and possible procedures to diagnose and treat lung cancer.  How effective is LDCT at preventing death from lung cancer?  Studies have shown that LDCT lung cancer screening can lower the risk of death from lung cancer by 20 percent in people who are at high-risk.  What are the risks?  There are some risks and limitations of LDCT lung cancer screening. We want to make sure you understand the risks and benefits, so please let us know if you have any questions. Your doctor may want to talk with you more about these risks.    Radiation exposure: As with any exam that uses radiation, there is a very small increased risk of cancer. The amount of radiation in LDCT is small--about the same amount a person would get from a mammogram. Your doctor orders the exam  when he or she feels the potential benefits outweigh the risks.    False negatives: No test is perfect, including LDCT. It is possible that you may have a medical condition, including lung cancer, that is not found during your exam. This is called a false negative result.    False positives and more testing: LDCT very often finds something in the lung that could be cancer, but in fact is not. This is called a false positive result. False positive tests often cause anxiety. To make sure these findings are not cancer, you may need to have more tests. These tests will be done only if you give us permission. Sometimes patients need a treatment that can have side effects, such as a biopsy. For more information on false positives, see  What can I expect from the results?     Findings not related to lung cancer: Your LDCT exam also takes pictures of areas of your body next to your lungs. In a very small number of cases, the CT scan will show an abnormal finding in one of these areas, such as your kidneys, adrenal glands, liver or thyroid. This finding may not be serious, but you may need more tests. Your doctor can help you decide what other tests you may need, if any.  What can I expect from the results?  About 1 out of 4 LDCT exams will find something that may need more tests. Most of the time, these findings are lung nodules. Lung nodules are very small collections of tissue in the lung. These nodules are very common, and the vast majority--more than 97 percent--are not cancer (benign). Most are normal lymph nodes or small areas of scarring from past infections.  But, if a small lung nodule is found to be cancer, the cancer can be cured more than 90 percent of the time. To know if the nodule is cancer, we may need to get more images before your next yearly screening exam. If the nodule has suspicious features (for example, it is large, has an odd shape or grows over time), we will refer you to a specialist for further  testing.  Will my doctor also get the results?  Yes. Your doctor will get a copy of your results.  Is it okay to keep smoking now that there s a cancer screening exam?  No. Tobacco is one of the strongest cancer-causing agents. It causes not only lung cancer, but other cancers and cardiovascular (heart) diseases as well. The damage caused by smoking builds over time. This means that the longer you smoke, the higher your risk of disease. While it is never too late to quit, the sooner you quit, the better.  Where can I find help to quit smoking?  The best way to prevent lung cancer is to stop smoking. If you have already quit smoking, congratulations and keep it up! For help on quitting smoking, please call [facility] at 7-801-103-GSGO ([phone]) or the American [employer] Society at 1-544.263.7539 to find local resources near you.  One-on-one health coaching:  If you d prefer to work individually with a health care provider on tobacco cessation, we offer:      Medication Therapy Management:  Our specially trained pharmacists work closely with you and your doctor to help you quit smoking.  Call [phone] or [phone] (toll free).     Can Do: Health coaching offered by [facility].  [email or web address]

## 2019-02-19 ENCOUNTER — ANCILLARY PROCEDURE (OUTPATIENT)
Dept: CT IMAGING | Facility: CLINIC | Age: 59
End: 2019-02-19
Payer: MEDICARE

## 2019-02-19 DIAGNOSIS — Z87.891 PERSONAL HISTORY OF TOBACCO USE: ICD-10-CM

## 2019-04-01 ENCOUNTER — TRANSFERRED RECORDS (OUTPATIENT)
Dept: HEALTH INFORMATION MANAGEMENT | Facility: CLINIC | Age: 59
End: 2019-04-01

## 2019-05-14 DIAGNOSIS — I10 BENIGN ESSENTIAL HYPERTENSION: ICD-10-CM

## 2019-05-14 RX ORDER — AMLODIPINE BESYLATE 5 MG/1
5 TABLET ORAL DAILY
Qty: 90 TABLET | Refills: 3 | Status: SHIPPED | OUTPATIENT
Start: 2019-05-14 | End: 2019-05-31

## 2019-05-31 ENCOUNTER — OFFICE VISIT (OUTPATIENT)
Dept: FAMILY MEDICINE | Facility: CLINIC | Age: 59
End: 2019-05-31
Payer: MEDICARE

## 2019-05-31 VITALS
RESPIRATION RATE: 18 BRPM | DIASTOLIC BLOOD PRESSURE: 92 MMHG | BODY MASS INDEX: 18.29 KG/M2 | WEIGHT: 138 LBS | OXYGEN SATURATION: 99 % | HEART RATE: 88 BPM | TEMPERATURE: 98.2 F | SYSTOLIC BLOOD PRESSURE: 134 MMHG | HEIGHT: 73 IN

## 2019-05-31 DIAGNOSIS — Z12.2 ENCOUNTER FOR SCREENING FOR LUNG CANCER: Primary | ICD-10-CM

## 2019-05-31 DIAGNOSIS — I10 BENIGN ESSENTIAL HYPERTENSION: ICD-10-CM

## 2019-05-31 RX ORDER — AMLODIPINE BESYLATE 5 MG/1
7.5 TABLET ORAL DAILY
Qty: 90 TABLET | Refills: 3
Start: 2019-05-31 | End: 2019-12-23

## 2019-05-31 ASSESSMENT — PAIN SCALES - GENERAL: PAINLEVEL: NO PAIN (0)

## 2019-05-31 ASSESSMENT — ENCOUNTER SYMPTOMS
SHORTNESS OF BREATH: 0
UNEXPECTED WEIGHT CHANGE: 0
CHILLS: 0
HEADACHES: 0
FEVER: 0

## 2019-05-31 ASSESSMENT — MIFFLIN-ST. JEOR: SCORE: 1492.22

## 2019-05-31 NOTE — PROGRESS NOTES
Preceptor Attestation:   Patient seen, evaluated and discussed with the resident. I have verified the content of the note, which accurately reflects my assessment of the patient and the plan of care.   Supervising Physician:  Davian Alberts MD

## 2019-05-31 NOTE — PROGRESS NOTES
"       HPI       Delroy Foley is a 59 year old  who presents for   Chief Complaint   Patient presents with     RECHECK     follow up ct scan 2/19/19     Had low dose CT lung on 2/19/2019 for lung cancer screening. Findings include 2x2mm solid nodule In RLL, 2x2mm solid nodule in subpleural LLL. Moderate to severe upper lobe predominant centrilobular and paraseptal emphysema. Recommendation to continue annual lung cancer screening.     Hypertension:   Currently taking amlodipine 5mg daily. Has consistently had DBP >90mmHg.  Denies headache, shortness of breath, chest pain.  His medications are organized by support staff at his home.   +++++++    Problem, Medication and Allergy Lists were reviewed and updated if needed..    Patient is an established patient of this clinic..         Review of Systems:   Review of Systems   Constitutional: Negative for chills, fever and unexpected weight change.   Respiratory: Negative for shortness of breath.    Cardiovascular: Negative for chest pain and leg swelling.   Neurological: Negative for headaches.            Physical Exam:     Vitals:    05/31/19 0916   BP: (!) 134/92   Pulse: 88   Resp: 18   Temp: 98.2  F (36.8  C)   TempSrc: Oral   SpO2: 99%   Weight: 62.6 kg (138 lb)   Height: 1.85 m (6' 0.84\")     Body mass index is 18.29 kg/m .  Vitals were reviewed and were normal     Physical Exam   Constitutional: No distress.   HENT:   Head: Normocephalic and atraumatic.   Pulmonary/Chest: Effort normal.   Neurological: He is alert.   Skin: He is not diaphoretic.   Psychiatric: He has a normal mood and affect. His behavior is normal.       Results:   Findings: [All follow up of nodules are based on ACR guidelines for  lung cancer screening and measurements of each nodule size must be the  mean of the longest 2 axial plane perpendiculars]  Nodules: 2  The largest and/or fastest 2 of these nodule(s) are as follows:     - 2 x 2 mm solid nodule in the right lower lobe on series:  2 " image:   350.     - 2 x 2 mm solid nodule in the subpleural left lower lobe on series:   2 image:  309.     Emphysema: Moderate to severe upper lobe predominant centrilobular and  the paraseptal emphysema.      Mosaic attenuation: Yes     Bronchial wall thickening: Yes.     Bronchiectasis: No     Coronary artery calcium: No     Other portions of the chest: No     Upper abdomen: Limited. No acute findings.     Bones: No suspicious bone findings.  Assessment and Plan        1. Encounter for screening for lung cancer  CT lung cancer screening results reviewed with patient.  Recommendation is for annual CT.  Necklow dose CT Lung 2/2020.    2. Benign essential hypertension  Patient's diastolic blood pressure remains elevated above goal.  Will increase amlodipine from 5 mg to 7.5 mg daily.  Patient's medications are managed by support staff at home who placed his meds into pillboxes.  Follow-up in 2 months.  - amLODIPine (NORVASC) 5 MG tablet; Take 1.5 tablets (7.5 mg) by mouth daily  Dispense: 90 tablet; Refill: 3       Medications Discontinued During This Encounter   Medication Reason     amLODIPine (NORVASC) 5 MG tablet        Options for treatment and follow-up care were reviewed with the patient. Delroy Foley  engaged in the decision making process and verbalized understanding of the options discussed and agreed with the final plan.    Poonam Jay MD  Family Medicine PGY3 Resident

## 2019-05-31 NOTE — PATIENT INSTRUCTIONS
Your CT scan showed a couple of small nodules. The recommendation is for annual CT of the lungs.     Increase your amlodipine to 1 and a half pills aka 7.5mg.

## 2019-08-27 ENCOUNTER — MEDICAL CORRESPONDENCE (OUTPATIENT)
Dept: HEALTH INFORMATION MANAGEMENT | Facility: CLINIC | Age: 59
End: 2019-08-27

## 2019-10-01 NOTE — PROGRESS NOTES
HPI       Delroy Foley is a 59 year old  who presents for   Chief Complaint   Patient presents with     RECHECK     BP     BP re-check  Norvasc in 05/2019 increased from 5 to 7.5 mg.  Doing well. No light-headedness, no LE swelling.    Labs  Due for depakote level check. Usually gest 1-2x/year.  [facility]  [phone]  Ph: [phone]    Fell 1 week ago, collar bone was sore after. Wants this looked at    Current smoking - 1 ppd. Does not desire to quit    Problem, Medication and Allergy Lists were reviewed and updated if needed..    Patient is an established patient of this clinic..         Review of Systems:   Review of Systems   Respiratory: Positive for cough. Negative for chest tightness and shortness of breath.    Cardiovascular: Negative for chest pain, palpitations and leg swelling.   Musculoskeletal: Negative for myalgias.   Neurological: Negative for light-headedness and headaches.            Physical Exam:     Vitals:    10/02/19 1040   BP: 101/68   Pulse: 105   SpO2: 98%   Weight: 64.9 kg (143 lb)   Height: 1.829 m (6')     Body mass index is 19.39 kg/m .  Vitals were reviewed and were normal     Physical Exam  Constitutional:       General: He is not in acute distress.     Appearance: He is well-developed. He is not diaphoretic.   HENT:      Head: Normocephalic and atraumatic.   Eyes:      Conjunctiva/sclera: Conjunctivae normal.   Neck:      Musculoskeletal: Normal range of motion.      Comments: Clavicles intact bilaterally, no tenderness or swelling or erythema.  Cardiovascular:      Rate and Rhythm: Regular rhythm. Tachycardia present.      Pulses: Normal pulses.      Heart sounds: No murmur.   Pulmonary:      Effort: Pulmonary effort is normal. No respiratory distress.      Breath sounds: No wheezing or rales.   Musculoskeletal: Normal range of motion.   Skin:     General: Skin is warm.   Neurological:      Mental Status: He is alert  and oriented to person, place, and time.      Comments: Tardive movements of mouth   Psychiatric:         Mood and Affect: Mood normal.         Behavior: Behavior normal.         Thought Content: Thought content normal.         Judgement: Judgment normal.           Results:   Results are ordered and pending    Assessment and Plan        Delroy was seen today for recheck.    Diagnoses and all orders for this visit:    Essential hypertension with goal blood pressure less than 140/90  At goal. Will continue on current dose of norvasc 7.5 mg. No adverse effects such as dizziness, light-headedness.    Encounter for therapeutic drug monitoring  Schizoaffective disorder, unspecified type (H)  Results to be faxed to .  -     Valproic acid    Drug-induced erectile dysfunction  Refills provided. Advised to watch for signs of hypotension.  -     sildenafil (VIAGRA) 25 MG tablet; Take 1 tablet (25 mg) by mouth as needed 1 hour before needed    Smoking  1 ppd currently. No desire to quit. Will continue to monitor.     PPSV-23 received today       Medications Discontinued During This Encounter   Medication Reason     sildenafil (VIAGRA) 25 MG tablet Reorder       Options for treatment and follow-up care were reviewed with the patient. Delroy SUNDEEP Foley  engaged in the decision making process and verbalized understanding of the options discussed and agreed with the final plan.    Mary Jo Bhandari MD

## 2019-10-02 ENCOUNTER — OFFICE VISIT (OUTPATIENT)
Dept: FAMILY MEDICINE | Facility: CLINIC | Age: 59
End: 2019-10-02
Payer: MEDICARE

## 2019-10-02 VITALS
OXYGEN SATURATION: 98 % | BODY MASS INDEX: 19.37 KG/M2 | HEART RATE: 105 BPM | DIASTOLIC BLOOD PRESSURE: 68 MMHG | SYSTOLIC BLOOD PRESSURE: 101 MMHG | HEIGHT: 72 IN | WEIGHT: 143 LBS

## 2019-10-02 DIAGNOSIS — N52.2 DRUG-INDUCED ERECTILE DYSFUNCTION: ICD-10-CM

## 2019-10-02 DIAGNOSIS — F17.219 CIGARETTE NICOTINE DEPENDENCE WITH NICOTINE-INDUCED DISORDER: ICD-10-CM

## 2019-10-02 DIAGNOSIS — Z51.81 ENCOUNTER FOR THERAPEUTIC DRUG MONITORING: Primary | ICD-10-CM

## 2019-10-02 DIAGNOSIS — Z23 ENCOUNTER FOR IMMUNIZATION: ICD-10-CM

## 2019-10-02 DIAGNOSIS — F25.9 SCHIZOAFFECTIVE DISORDER, UNSPECIFIED TYPE (H): ICD-10-CM

## 2019-10-02 DIAGNOSIS — I10 ESSENTIAL HYPERTENSION WITH GOAL BLOOD PRESSURE LESS THAN 140/90: ICD-10-CM

## 2019-10-02 PROBLEM — F17.200 NICOTINE DEPENDENCE: Status: ACTIVE | Noted: 2019-10-02

## 2019-10-02 LAB — VALPROATE SERPL-MCNC: 125 MG/L (ref 50–100)

## 2019-10-02 RX ORDER — SILDENAFIL 25 MG/1
25 TABLET, FILM COATED ORAL PRN
Qty: 30 TABLET | Refills: 1 | Status: SHIPPED | OUTPATIENT
Start: 2019-10-02 | End: 2021-03-15

## 2019-10-02 ASSESSMENT — ENCOUNTER SYMPTOMS
SHORTNESS OF BREATH: 0
PALPITATIONS: 0
CHEST TIGHTNESS: 0
MYALGIAS: 0
COUGH: 1
HEADACHES: 0
LIGHT-HEADEDNESS: 0

## 2019-10-02 ASSESSMENT — MIFFLIN-ST. JEOR: SCORE: 1501.64

## 2019-10-02 NOTE — PROGRESS NOTES
Preceptor Attestation:   Patient seen, evaluated and discussed with the resident. I have verified the content of the note, which accurately reflects my assessment of the patient and the plan of care.   Supervising Physician:  Ced Edwards MD.

## 2019-10-02 NOTE — PATIENT INSTRUCTIONS
Blood pressure  Continue with current dose of norvasc    Valproic levels today - we will fax the results over.    Watch out for symptoms of low blood pressure: light-headedness, vision changes.

## 2019-10-03 ENCOUNTER — TELEPHONE (OUTPATIENT)
Dept: FAMILY MEDICINE | Facility: CLINIC | Age: 59
End: 2019-10-03

## 2019-12-23 DIAGNOSIS — I10 BENIGN ESSENTIAL HYPERTENSION: ICD-10-CM

## 2019-12-23 RX ORDER — AMLODIPINE BESYLATE 5 MG/1
7.5 TABLET ORAL DAILY
Qty: 90 TABLET | Refills: 3 | Status: SHIPPED | OUTPATIENT
Start: 2019-12-23 | End: 2020-06-22

## 2019-12-23 NOTE — TELEPHONE ENCOUNTER
"Request for medication refill: Amlodipine     Providers if patient needs an appointment and you are willing to give a one month supply please refill for one month and  send a letter/MyChart using \".SMILLIMITEDREFILL\" .smillimited and route chart to \"P SMI \" (Giving one month refill in non controlled medications is strongly recommended before denial)    If refill has been denied, meaning absolutely no refills without visit, please complete the smart phrase \".smirxrefuse\" and route it to the \"P SMI MED REFILLS\"  pool to inform the patient and the pharmacy.    Catalina Medel, CMA        "

## 2020-04-03 ENCOUNTER — TRANSFERRED RECORDS (OUTPATIENT)
Dept: HEALTH INFORMATION MANAGEMENT | Facility: CLINIC | Age: 60
End: 2020-04-03

## 2020-06-22 DIAGNOSIS — I10 BENIGN ESSENTIAL HYPERTENSION: ICD-10-CM

## 2020-06-22 RX ORDER — AMLODIPINE BESYLATE 5 MG/1
7.5 TABLET ORAL DAILY
Qty: 90 TABLET | Refills: 3 | Status: SHIPPED | OUTPATIENT
Start: 2020-06-22 | End: 2021-01-15

## 2020-10-29 DIAGNOSIS — F43.89 OTHER REACTIONS TO SEVERE STRESS: Primary | ICD-10-CM

## 2020-10-29 DIAGNOSIS — F43.89 OTHER REACTIONS TO SEVERE STRESS: ICD-10-CM

## 2020-10-29 LAB
ALBUMIN SERPL-MCNC: 3.5 G/DL (ref 3.4–5)
ALP SERPL-CCNC: 70 U/L (ref 40–150)
ALT SERPL W P-5'-P-CCNC: 9 U/L (ref 0–70)
ANION GAP SERPL CALCULATED.3IONS-SCNC: 8 MMOL/L (ref 3–14)
AST SERPL W P-5'-P-CCNC: 5 U/L (ref 0–45)
BASOPHILS # BLD AUTO: 0 10E9/L (ref 0–0.2)
BASOPHILS NFR BLD AUTO: 0.2 %
BILIRUB DIRECT SERPL-MCNC: 0.2 MG/DL (ref 0–0.2)
BILIRUB SERPL-MCNC: 0.4 MG/DL (ref 0.2–1.3)
BUN SERPL-MCNC: 8 MG/DL (ref 7–30)
CALCIUM SERPL-MCNC: 8.3 MG/DL (ref 8.5–10.1)
CHLORIDE SERPL-SCNC: 114 MMOL/L (ref 94–109)
CHOLEST SERPL-MCNC: 99 MG/DL
CO2 SERPL-SCNC: 22 MMOL/L (ref 20–32)
CREAT SERPL-MCNC: 1.32 MG/DL (ref 0.66–1.25)
DIFFERENTIAL METHOD BLD: ABNORMAL
EOSINOPHIL # BLD AUTO: 0.1 10E9/L (ref 0–0.7)
EOSINOPHIL NFR BLD AUTO: 0.7 %
ERYTHROCYTE [DISTWIDTH] IN BLOOD BY AUTOMATED COUNT: 14.3 % (ref 10–15)
GFR SERPL CREATININE-BSD FRML MDRD: 58 ML/MIN/{1.73_M2}
GLUCOSE SERPL-MCNC: 97 MG/DL (ref 70–99)
HCT VFR BLD AUTO: 37.6 % (ref 40–53)
HDLC SERPL-MCNC: 48 MG/DL
HGB BLD-MCNC: 12.2 G/DL (ref 13.3–17.7)
IMM GRANULOCYTES # BLD: 0 10E9/L (ref 0–0.4)
IMM GRANULOCYTES NFR BLD: 0.4 %
LDLC SERPL CALC-MCNC: 40 MG/DL
LYMPHOCYTES # BLD AUTO: 1.4 10E9/L (ref 0.8–5.3)
LYMPHOCYTES NFR BLD AUTO: 17.5 %
MCH RBC QN AUTO: 32.2 PG (ref 26.5–33)
MCHC RBC AUTO-ENTMCNC: 32.4 G/DL (ref 31.5–36.5)
MCV RBC AUTO: 99 FL (ref 78–100)
MONOCYTES # BLD AUTO: 0.6 10E9/L (ref 0–1.3)
MONOCYTES NFR BLD AUTO: 7.3 %
NEUTROPHILS # BLD AUTO: 5.9 10E9/L (ref 1.6–8.3)
NEUTROPHILS NFR BLD AUTO: 73.9 %
NONHDLC SERPL-MCNC: 52 MG/DL
NRBC # BLD AUTO: 0 10*3/UL
NRBC BLD AUTO-RTO: 0 /100
PLATELET # BLD AUTO: 306 10E9/L (ref 150–450)
POTASSIUM SERPL-SCNC: 3.5 MMOL/L (ref 3.4–5.3)
PROT SERPL-MCNC: 6.5 G/DL (ref 6.8–8.8)
RBC # BLD AUTO: 3.79 10E12/L (ref 4.4–5.9)
SODIUM SERPL-SCNC: 144 MMOL/L (ref 133–144)
T4 FREE SERPL-MCNC: 1.03 NG/DL (ref 0.76–1.46)
TRIGL SERPL-MCNC: 57 MG/DL
TSH SERPL DL<=0.005 MIU/L-ACNC: 0.18 MU/L (ref 0.4–4)
VALPROATE SERPL-MCNC: 205 MG/L (ref 50–100)
WBC # BLD AUTO: 8.1 10E9/L (ref 4–11)

## 2020-10-29 PROCEDURE — 80076 HEPATIC FUNCTION PANEL: CPT | Performed by: FAMILY MEDICINE

## 2020-10-29 PROCEDURE — 80061 LIPID PANEL: CPT | Performed by: FAMILY MEDICINE

## 2020-10-29 PROCEDURE — 85025 COMPLETE CBC W/AUTO DIFF WBC: CPT | Performed by: FAMILY MEDICINE

## 2020-10-29 PROCEDURE — 84443 ASSAY THYROID STIM HORMONE: CPT | Performed by: FAMILY MEDICINE

## 2020-10-29 PROCEDURE — 80164 ASSAY DIPROPYLACETIC ACD TOT: CPT | Performed by: FAMILY MEDICINE

## 2020-10-29 PROCEDURE — 80048 BASIC METABOLIC PNL TOTAL CA: CPT | Performed by: FAMILY MEDICINE

## 2020-10-29 PROCEDURE — 84439 ASSAY OF FREE THYROXINE: CPT | Performed by: FAMILY MEDICINE

## 2020-10-29 PROCEDURE — 36415 COLL VENOUS BLD VENIPUNCTURE: CPT | Performed by: FAMILY MEDICINE

## 2020-12-09 DIAGNOSIS — F31.74 MANIC DISORDER, RECURRENT EPISODE, IN FULL REMISSION (H): Primary | ICD-10-CM

## 2020-12-09 DIAGNOSIS — F31.74 MANIC DISORDER, RECURRENT EPISODE, IN FULL REMISSION (H): ICD-10-CM

## 2020-12-09 LAB — VALPROATE SERPL-MCNC: 136 MG/L (ref 50–100)

## 2020-12-09 PROCEDURE — 36415 COLL VENOUS BLD VENIPUNCTURE: CPT | Performed by: FAMILY MEDICINE

## 2020-12-09 PROCEDURE — 80164 ASSAY DIPROPYLACETIC ACD TOT: CPT | Performed by: FAMILY MEDICINE

## 2020-12-17 ENCOUNTER — VIRTUAL VISIT (OUTPATIENT)
Dept: FAMILY MEDICINE | Facility: CLINIC | Age: 60
End: 2020-12-17
Payer: COMMERCIAL

## 2020-12-17 DIAGNOSIS — Z00.00 ROUTINE GENERAL MEDICAL EXAMINATION AT A HEALTH CARE FACILITY: Primary | ICD-10-CM

## 2020-12-17 DIAGNOSIS — R35.0 BENIGN PROSTATIC HYPERPLASIA WITH URINARY FREQUENCY: ICD-10-CM

## 2020-12-17 DIAGNOSIS — F17.219 CIGARETTE NICOTINE DEPENDENCE WITH NICOTINE-INDUCED DISORDER: ICD-10-CM

## 2020-12-17 DIAGNOSIS — N40.1 BENIGN PROSTATIC HYPERPLASIA WITH URINARY FREQUENCY: ICD-10-CM

## 2020-12-17 DIAGNOSIS — Z87.891 PERSONAL HISTORY OF TOBACCO USE: ICD-10-CM

## 2020-12-17 PROCEDURE — 99441 PR PHYSICIAN TELEPHONE EVALUATION 5-10 MIN: CPT | Mod: GT | Performed by: STUDENT IN AN ORGANIZED HEALTH CARE EDUCATION/TRAINING PROGRAM

## 2020-12-17 RX ORDER — TAMSULOSIN HYDROCHLORIDE 0.4 MG/1
0.4 CAPSULE ORAL DAILY
Qty: 30 CAPSULE | Refills: 0 | Status: SHIPPED | OUTPATIENT
Start: 2020-12-17 | End: 2021-01-18

## 2020-12-17 NOTE — PROGRESS NOTES
"           Telephone Visit Consent   Patient was verbally read the following and verbal consent was obtained.    \"Telephone visits are billed at different rates depending on your insurance coverage. During this emergency period, for some insurers they may be billed the same as an in-person visit.  Please reach out to your insurance provider with any questions.  If during the course of the call the physician/provider feels a telephone visit is not appropriate, you will not be charged for this service.\"    Name person giving consent:  Patient   Date verbal consent given:  12/17/2020  Time verbal consent given:  1:37 PM      Male Physical Note          HPI         Concerns today:   - Going pee a lot: not painful, feels relieving. Mostly an issue during the day. Doesn't usually bother him at night but has had 1-2 instances when he has woken up in the middle of the night to pee and hasn't been able to make it. Occasionally has trouble starting to pee, but then no issues with flow after that. Feels like he is emptying completely when he pees. No blood in the urine.     Patient Active Problem List   Diagnosis     Chronic kidney disease, stage II (mild)     Essential hypertension with goal blood pressure less than 140/90     Schizoaffective disorder (H)     Dysesthesia     Abnormal colonoscopy     Diverticulosis of sigmoid colon     ACP (advance care planning)     S/P catheter ablation of slow pathway     SVT (supraventricular tachycardia) (H)     Hepatitis C virus infection without hepatic coma, unspecified chronicity     Tardive dyskinesia     Vision loss of right eye     Nicotine dependence       Past Medical History:   Diagnosis Date     Arrhythmia     SVT     Chronic kidney disease, stage II (mild)      Paroxysmal supraventricular tachycardia (H)      Unspecified essential hypertension        Previous Medical Care   Last seen for physical exam 1 year ago  No new medical problems in the past year. No ED visits and no " hospitalizations.      History reviewed. No pertinent family history.           Review of Systems:     Review of Systems:  CONSTITUTIONAL: NEGATIVE for fever, chills, change in weight  INTEGUMENTARY/SKIN: NEGATIVE for worrisome rashes, moles or lesions  EYES: NEGATIVE for vision changes or irritation  ENT/MOUTH: NEGATIVE for ear, mouth and throat problems  RESP: NEGATIVE for significant cough or SOB  CV: NEGATIVE for chest pain, palpitations or peripheral edema  GI: NEGATIVE for nausea, abdominal pain, heartburn, or change in bowel habits  : frequency  MUSCULOSKELETAL: NEGATIVE for significant arthralgias or myalgia  NEURO: NEGATIVE for weakness, dizziness or paresthesias  HEME/ALLERGY: NEGATIVE for bleeding problems  PSYCHIATRIC: NEGATIVE for changes in mood or affect    Sleep:   Do you snore most or the night (as reported by a family member)? Doesn't know  Do you feel sleepy or extremely tired during most of the day? No             Social History     Social History     Socioeconomic History     Marital status: Single     Spouse name: Not on file     Number of children: 0     Years of education: Not on file     Highest education level: Not on file   Occupational History     Not on file   Social Needs     Financial resource strain: Not on file     Food insecurity     Worry: Not on file     Inability: Not on file     Transportation needs     Medical: Not on file     Non-medical: Not on file   Tobacco Use     Smoking status: Current Every Day Smoker     Packs/day: 1.00     Years: 39.00     Pack years: 39.00     Types: Cigarettes     Smokeless tobacco: Never Used   Substance and Sexual Activity     Alcohol use: No     Drug use: No     Sexual activity: Never   Lifestyle     Physical activity     Days per week: Not on file     Minutes per session: Not on file     Stress: Not on file   Relationships     Social connections     Talks on phone: Not on file     Gets together: Not on file     Attends Anabaptism service: Not  on file     Active member of club or organization: Not on file     Attends meetings of clubs or organizations: Not on file     Relationship status: Not on file     Intimate partner violence     Fear of current or ex partner: Not on file     Emotionally abused: Not on file     Physically abused: Not on file     Forced sexual activity: Not on file   Other Topics Concern     Parent/sibling w/ CABG, MI or angioplasty before 65F 55M? Not Asked      Service Not Asked     Blood Transfusions Not Asked     Caffeine Concern Yes     Occupational Exposure Not Asked     Hobby Hazards Not Asked     Sleep Concern Not Asked     Stress Concern Not Asked     Weight Concern Not Asked     Special Diet Not Asked     Back Care Not Asked     Exercise Not Asked     Bike Helmet Not Asked     Seat Belt Not Asked     Self-Exams Not Asked   Social History Narrative    Goes to Adult Day Care     Lives independently with 4-5 other men in a house in [place].    CADI  [place], via [facility]    Receives PCA services, ILS services, 24hr emergency assitance, metro bus/train card        Has mental health .       Marital Status:Single  Who lives in your household? No one    Has anyone hurt you physically, for example by pushing, hitting, slapping or kicking you or forcing you to have sex? Denies  Do you feel threatened or controlled by a partner, ex-partner or anyone in your life? Denies    Sexual Health     Sexual concerns: No   STI History: Neg      Recommended Screening       Lung CA Screening with low dose CT (55 - 80, with >= 30 ppy smoking history who are current smokers or quit within last 15y - annual low dose CT) Recommended and patient accepted testing.             Physical Exam:       There were no vitals taken for this visit.  Estimated body mass index is 19.39 kg/m  as calculated from the following:    Height as of 10/2/19: 1.829 m (6').    Weight as of 10/2/19: 64.9 kg (143 lb).    Exam:  Constitutional:  healthy, alert and no distress  Psychiatric: Affect flat and blunted, mentation normal. Alert and oriented.       Assessment and Plan      Delroy was seen today for physical.    Diagnoses and all orders for this visit:    Routine general medical examination at a health care facility  -     tamsulosin (FLOMAX) 0.4 MG capsule; Take 1 capsule (0.4 mg) by mouth daily        1. Health Care Maintenance: Normal Physical Exam  -RTC in 1 year for next routine physical exam    2.  Tobacco use disorder: Patient with lung nodules found on screening chest CT in 2019.  Recommendation at that time for yearly low-dose CT screening for continued evaluation.  -Low-dose chest CT order placed, will route to care coordinator to assist with scheduling    3.  BPH with LUTS: Patient describing urinary frequency and occasional difficulty with initiating stream of urine.  Unable to assess prostate size with physical exam due to telephone visit, however, given patient's age and history, most likely systems associated with BPH.  Will initiate Flomax today at low-dose, RTC in 2 weeks via virtual or in person visit to follow-up medication and consider increasing dose if tolerating well.      Options for treatment and follow-up care were reviewed with the patient. Delroy Foley and/or guardian engaged in the decision making process and verbalized understanding of the options discussed and agreed with the final plan.    Digna Templeton MD      [id number]}

## 2020-12-17 NOTE — PROGRESS NOTES
Preceptor Attestation:   I talked to the patient on the phone. I discussed the patient with the resident. I have verified the content of the note, which accurately reflects my assessment of the patient and the plan of care.   Supervising Physician:  Merly Sosa MD.

## 2020-12-17 NOTE — Clinical Note
Dr. Sosa: FYI  [facility]: Please print and mail this patient's AVS. Thank you!  Nhung: this patient could use assistance with scheduling his chest CT--can we help to arrange that? Thank you!

## 2020-12-17 NOTE — PATIENT INSTRUCTIONS
Preventive Health Recommendations  Male Ages 50 - 64    Yearly exam:             See your health care provider every year in order to  o   Review health changes.   o   Discuss preventive care.    o   Review your medicines if your doctor has prescribed any.     Have a cholesterol test every 5 years, or more frequently if you are at risk for high cholesterol/heart disease.     Have a diabetes test (fasting glucose) every three years. If you are at risk for diabetes, you should have this test more often.     Have a colonoscopy at age 50, or have a yearly FIT test (stool test). These exams will check for colon cancer.      Talk with your health care provider about whether or not a prostate cancer screening test (PSA) is right for you.    You should be tested each year for STDs (sexually transmitted diseases), if you re at risk.     Shots: Get a flu shot each year. Get a tetanus shot every 10 years.     Nutrition:    Eat at least 5 servings of fruits and vegetables daily.     Eat whole-grain bread, whole-wheat pasta and brown rice instead of white grains and rice.     Get adequate Calcium and Vitamin D.     Lifestyle    Exercise for at least 150 minutes a week (30 minutes a day, 5 days a week). This will help you control your weight and prevent disease.     Limit alcohol to one drink per day.     No smoking.     Wear sunscreen to prevent skin cancer.     See your dentist every six months for an exam and cleaning.     See your eye doctor every 1 to 2 years.    Lung Cancer Screening   Frequently Asked Questions  If you are at high-risk for lung cancer, getting screened with low-dose computed tomography (LDCT) every year can help save your life. This handout offers answers to some of the most common questions about lung cancer screening. If you have other questions, please call [phone] ([phone]).     What is it?  Lung cancer screening uses special X-ray technology to create an image of your lung tissue.  The exam is quick and easy and takes less than 10 seconds. We don t give you any medicine or use any needles. You can eat before and after the exam. You don t need to change your clothes as long as the clothing on your chest doesn t contain metal. But, you do need to be able to hold your breath for at least 6 seconds during the exam.    What is the goal of lung cancer screening?  The goal of lung cancer screening is to save lives. Many times, lung cancer is not found until a person starts having physical symptoms. Lung cancer screening can help detect lung cancer in the earliest stages when it may be easier to treat.    Who should be screened for lung cancer?  We suggest lung cancer screening for anyone who is at high-risk for lung cancer. You are in the high-risk group if you:      are between the ages of 55 and 79, and    have smoked at least 1 pack of cigarettes a day for 30 or more years, and    still smoke or have quit within the past 15 years.    However, if you have a new cough or shortness of breath, you should talk to your doctor before being screened.    Some national lung health advocacy groups also recommend screening for people ages 50 to 79 who have smoked an average of 1 pack of cigarettes a day for 20 years. They must also have at least 1 other risk factor for lung cancer, not including exposure to secondhand smoke. Other risk factors are having had cancer in the past, emphysema, pulmonary fibrosis, COPD, a family history of lung cancer, or exposure to certain materials such as arsenic, asbestos, beryllium, cadmium, chromium, diesel fumes, nickel, radon or silica. Your care team can help you know if you have one of these risk factors.     Why does it matter if I have symptoms?  Certain symptoms can be a sign that you have a condition in your lungs that should be checked and treated by your doctor. These symptoms include fever, chest pain, a new or changing cough, shortness of breath that you have  never felt before, coughing up blood or unexplained weight loss. Having any of these symptoms can greatly affect the results of lung cancer screening.       Should all smokers get an LDCT lung cancer screening exam?  It depends. Lung cancer screening is for a very specific group of men and women who have a history of heavy smoking over a long period of time (see  Who should be screened for lung cancer  above).  I am in the high-risk group, but have been diagnosed with cancer in the past. Is LDCT lung cancer screening right for me?  In some cases, you should not have LDCT lung screening, such as when your doctor is already following your cancer with CT scan studies. Your doctor will help you decide if LDCT lung screening is right for you.  Do I need to have a screening exam every year?  Yes. If you are in the high-risk group described earlier, you should get an LDCT lung cancer screening exam every year until you are 79, or are no longer willing or able to undergo screening and possible procedures to diagnose and treat lung cancer.  How effective is LDCT at preventing death from lung cancer?  Studies have shown that LDCT lung cancer screening can lower the risk of death from lung cancer by 20 percent in people who are at high-risk.  What are the risks?  There are some risks and limitations of LDCT lung cancer screening. We want to make sure you understand the risks and benefits, so please let us know if you have any questions. Your doctor may want to talk with you more about these risks.    Radiation exposure: As with any exam that uses radiation, there is a very small increased risk of cancer. The amount of radiation in LDCT is small--about the same amount a person would get from a mammogram. Your doctor orders the exam when he or she feels the potential benefits outweigh the risks.    False negatives: No test is perfect, including LDCT. It is possible that you may have a medical condition, including lung cancer,  that is not found during your exam. This is called a false negative result.    False positives and more testing: LDCT very often finds something in the lung that could be cancer, but in fact is not. This is called a false positive result. False positive tests often cause anxiety. To make sure these findings are not cancer, you may need to have more tests. These tests will be done only if you give us permission. Sometimes patients need a treatment that can have side effects, such as a biopsy. For more information on false positives, see  What can I expect from the results?     Findings not related to lung cancer: Your LDCT exam also takes pictures of areas of your body next to your lungs. In a very small number of cases, the CT scan will show an abnormal finding in one of these areas, such as your kidneys, adrenal glands, liver or thyroid. This finding may not be serious, but you may need more tests. Your doctor can help you decide what other tests you may need, if any.  What can I expect from the results?  About 1 out of 4 LDCT exams will find something that may need more tests. Most of the time, these findings are lung nodules. Lung nodules are very small collections of tissue in the lung. These nodules are very common, and the vast majority--more than 97 percent--are not cancer (benign). Most are normal lymph nodes or small areas of scarring from past infections.  But, if a small lung nodule is found to be cancer, the cancer can be cured more than 90 percent of the time. To know if the nodule is cancer, we may need to get more images before your next yearly screening exam. If the nodule has suspicious features (for example, it is large, has an odd shape or grows over time), we will refer you to a specialist for further testing.  Will my doctor also get the results?  Yes. Your doctor will get a copy of your results.  Is it okay to keep smoking now that there s a cancer screening exam?  No. Tobacco is one of the  strongest cancer-causing agents. It causes not only lung cancer, but other cancers and cardiovascular (heart) diseases as well. The damage caused by smoking builds over time. This means that the longer you smoke, the higher your risk of disease. While it is never too late to quit, the sooner you quit, the better.  Where can I find help to quit smoking?  The best way to prevent lung cancer is to stop smoking. If you have already quit smoking, congratulations and keep it up! For help on quitting smoking, please call [employer] at 8-507-887-TUYJ ([phone]) or the American Cancer Society at 1-890.858.9959 to find local resources near you.  One-on-one health coaching:  If you d prefer to work individually with a health care provider on tobacco cessation, we offer:      Medication Therapy Management:  Our specially trained pharmacists work closely with you and your doctor to help you quit smoking.  Call [phone] or [phone] (toll free).     Can Do: Health coaching offered by [facility].  [email or web address]

## 2020-12-17 NOTE — PROGRESS NOTES
"Family Medicine Telephone Visit Note             Telephone Visit Consent   Patient was verbally read the following and verbal consent was obtained.    \"Telephone visits are billed at different rates depending on your insurance coverage. During this emergency period, for some insurers they may be billed the same as an in-person visit.  Please reach out to your insurance provider with any questions.  If during the course of the call the physician/provider feels a telephone visit is not appropriate, you will not be charged for this service.\"    Name person giving consent:  Patient   Date verbal consent given:  12/17/2020  Time verbal consent given:  1:37 PM           Chief Complaint   Patient presents with     Physical     no concerns                HPI   Patients name: Delroy  Appointment start time:  { :9873933}    {Superlists ():533760}      Current Outpatient Medications   Medication Sig Dispense Refill     acetaminophen (TYLENOL) 500 MG tablet Take 2 tablets (1,000 mg) by mouth every 6 hours as needed for mild pain 100 tablet 0     amLODIPine (NORVASC) 5 MG tablet Take 1.5 tablets (7.5 mg) by mouth daily 90 tablet 3     aspirin 81 MG tablet Take 1 tablet (81 mg) by mouth daily 90 tablet 3     benztropine (COGENTIN) 2 MG tablet Take 1 tablet (2 mg) by mouth daily as needed 30 tablet 2     cholecalciferol (VITAMIN D3) 61408 UNITS capsule Take 1 capsule (50,000 Units) by mouth once a week 10 capsule 0     clonazePAM (KLONOPIN) 0.5 MG tablet Take 1 tab po q AM and take 2 tabs po q HS. 90 tablet 0     divalproex (DEPAKOTE ER) 500 MG 24 hr tablet Take 4 tablets (2,000 mg) by mouth At Bedtime 120 tablet 2     OLANZapine (ZYPREXA) 20 MG tablet Take 2 tablets (40 mg) by mouth At Bedtime Please see MD before next refill 60 tablet 0     sildenafil (VIAGRA) 25 MG tablet Take 1 tablet (25 mg) by mouth as needed 1 hour before needed 30 tablet 1     hydrOXYzine (ATARAX) 25 MG tablet Take 25 mg by mouth 2 times daily.       " "Menthol, Topical Analgesic, 10 % GEL Externally apply topically 2 times daily as needed (pain) (Patient not taking: Reported on 12/17/2020) 70.8 g 0     Multiple Vitamin (MULTIVITAMINS PO) Take 1 tablet by mouth daily.       Allergies   Allergen Reactions     Haldol [Haloperidol]      Throat swelling     Risperdal [Risperidone]      Throat swelling              Review of Systems:     {ROS COMP (Optional):428171}         Physical Exam:     There were no vitals taken for this visit.  Estimated body mass index is 19.39 kg/m  as calculated from the following:    Height as of 10/2/19: 1.829 m (6').    Weight as of 10/2/19: 64.9 kg (143 lb).    Exam:  Constitutional: {GENERAL APPEARANCE:070819::\"healthy\",\"alert\",\"no distress\"}  Psychiatric: {NIALL PATIENT PSYCH APPEARANCE:421278::\"mentation appears normal\",\"affect normal/bright\"}    {Result Choices:119291}        Assessment and Plan   {Diag Picklist:305787}    Refilled medications that would be required in the next 3 months.     After Visit Information:  {avs options:822553}    No follow-ups on file.    Appointment end time: { :2592061}  This is a telephone visit that took *** minutes.      Clinician location:  [facility]     Dignayonatan Templeton MD  I precepted today with ***.    {Bill telephone visit time codes. Coding will change to an E&M code if the patient's insurance allows for this.  However, documentation should support E&M code billing.  35143: 5-10 min  72961: 11-20 min  42637: 21-30 min}      {[employer] Language Line Access (Help text- F2 to highlight then hit delete)    Dial [phone]    Answer  Welcome to the Language Line Services.  Please enter your six-digit client ID.    [place] enter [id number]  [place] enter [id number]  [facility] enter [id number]  [place] enter [id number]    Answer  \"For Turkish, press 1.  For all other languages, press 2.\"   they will ask you to say the name of the language.    Press either 1 (yes, correct) or 2 (no, " incorrect).}

## 2020-12-22 ENCOUNTER — TELEPHONE (OUTPATIENT)
Dept: FAMILY MEDICINE | Facility: CLINIC | Age: 60
End: 2020-12-22

## 2020-12-22 NOTE — LETTER
December 22, 2020      Delroy Foley  [address]  [place] [address]        Dear ,    We are writing to inform you that Doctor Templeton is requesting for you to be seen for a CT scan at one of our Radiology and Imaging sites. The number to call to set up that appointment is [phone]  If you have any questions or concerns, please call the clinic at the number listed above.       Sincerely,  [facility]

## 2020-12-22 NOTE — TELEPHONE ENCOUNTER
Unable to reach patient. Left voicemail's twice with numbers provided in patients chart. Letter was sent to patients address informing to call number provided on AVS to call Radiology and imaging to schedule CT scan.    Letter also written and sent to patients address provided in chart.    Puja Pompa MA

## 2021-01-07 ENCOUNTER — TELEPHONE (OUTPATIENT)
Dept: FAMILY MEDICINE | Facility: CLINIC | Age: 61
End: 2021-01-07

## 2021-01-07 DIAGNOSIS — F31.74 MANIC DISORDER, RECURRENT EPISODE, IN FULL REMISSION (H): Primary | ICD-10-CM

## 2021-01-07 LAB — VALPROATE SERPL-MCNC: 119 MG/L (ref 50–100)

## 2021-01-07 PROCEDURE — 80164 ASSAY DIPROPYLACETIC ACD TOT: CPT | Performed by: FAMILY MEDICINE

## 2021-01-07 PROCEDURE — 36415 COLL VENOUS BLD VENIPUNCTURE: CPT | Performed by: FAMILY MEDICINE

## 2021-01-07 NOTE — TELEPHONE ENCOUNTER
RN was asked by lab staff Miley to fax results of valporic acid level to [facility] [phone] once it has resulted. No result as of 1650 on 1/7/21. Rn will check again tomorrow.   Eliane Baker RN

## 2021-01-15 DIAGNOSIS — R35.0 BENIGN PROSTATIC HYPERPLASIA WITH URINARY FREQUENCY: ICD-10-CM

## 2021-01-15 DIAGNOSIS — N40.1 BENIGN PROSTATIC HYPERPLASIA WITH URINARY FREQUENCY: ICD-10-CM

## 2021-01-15 DIAGNOSIS — I10 BENIGN ESSENTIAL HYPERTENSION: ICD-10-CM

## 2021-01-15 RX ORDER — AMLODIPINE BESYLATE 5 MG/1
7.5 TABLET ORAL DAILY
Qty: 90 TABLET | Refills: 3 | Status: SHIPPED | OUTPATIENT
Start: 2021-01-15 | End: 2021-03-15

## 2021-01-15 NOTE — TELEPHONE ENCOUNTER
Amlodipine 5 mg tabs       LAST FILLED DATE: 12/21/2020  LAST FILLED STRENGTH: 5 MG  LAST FILLED QTY: 90  LAST OFFICE VISIT: Virtual Visit 12/17/2020

## 2021-01-15 NOTE — TELEPHONE ENCOUNTER
Tamsulosin 0.4mg last filled 12/18/2020 for #30    Thank you,    Arlen Michele, PharmD  [facility]  [phone]

## 2021-01-18 RX ORDER — TAMSULOSIN HYDROCHLORIDE 0.4 MG/1
0.4 CAPSULE ORAL DAILY
Qty: 30 CAPSULE | Refills: 0 | Status: SHIPPED | OUTPATIENT
Start: 2021-01-18 | End: 2021-02-23

## 2021-02-23 DIAGNOSIS — N40.1 BENIGN PROSTATIC HYPERPLASIA WITH URINARY FREQUENCY: ICD-10-CM

## 2021-02-23 DIAGNOSIS — R35.0 BENIGN PROSTATIC HYPERPLASIA WITH URINARY FREQUENCY: ICD-10-CM

## 2021-02-23 RX ORDER — TAMSULOSIN HYDROCHLORIDE 0.4 MG/1
0.4 CAPSULE ORAL DAILY
Qty: 30 CAPSULE | Refills: 0 | Status: SHIPPED | OUTPATIENT
Start: 2021-02-23 | End: 2021-03-15

## 2021-02-23 NOTE — TELEPHONE ENCOUNTER
Tamsulosin 0.4mg last filled 1/18/21 for #30    Thank you,    Arlen Michele, PharmD  [facility]  [phone]

## 2021-03-09 ENCOUNTER — TELEPHONE (OUTPATIENT)
Dept: FAMILY MEDICINE | Facility: CLINIC | Age: 61
End: 2021-03-09

## 2021-03-09 DIAGNOSIS — Z51.81 ENCOUNTER FOR THERAPEUTIC DRUG MONITORING: Primary | ICD-10-CM

## 2021-03-09 NOTE — TELEPHONE ENCOUNTER
[facility] Family Medicine phone call message - order or referral request from patient:     Order or referral being requested: Requested order: lab to check depakote level    Additional Details: patient requested lab only appointment to check depakote levels; scheduled for 3/12/21; please review and place order as appropriate    OK to leave a message on voice mail? Yes    Primary language: English      needed? No    Call taken on March 9, 2021 at 9:23 AM by Ban Ritchie    Order request route to [facility] TRIAGE   Referrals Route to [facility] (Green/[place]/Purple) CARE COORDINATOR

## 2021-03-12 DIAGNOSIS — Z51.81 ENCOUNTER FOR THERAPEUTIC DRUG MONITORING: ICD-10-CM

## 2021-03-12 LAB — VALPROATE SERPL-MCNC: 57 MG/L (ref 50–100)

## 2021-03-12 PROCEDURE — 80164 ASSAY DIPROPYLACETIC ACD TOT: CPT | Performed by: FAMILY MEDICINE

## 2021-03-12 PROCEDURE — 36415 COLL VENOUS BLD VENIPUNCTURE: CPT | Performed by: FAMILY MEDICINE

## 2021-03-15 ENCOUNTER — VIRTUAL VISIT (OUTPATIENT)
Dept: FAMILY MEDICINE | Facility: CLINIC | Age: 61
End: 2021-03-15
Payer: COMMERCIAL

## 2021-03-15 DIAGNOSIS — F20.0 PARANOID SCHIZOPHRENIA, CHRONIC CONDITION (H): ICD-10-CM

## 2021-03-15 DIAGNOSIS — I10 BENIGN ESSENTIAL HYPERTENSION: ICD-10-CM

## 2021-03-15 DIAGNOSIS — N52.2 DRUG-INDUCED ERECTILE DYSFUNCTION: ICD-10-CM

## 2021-03-15 DIAGNOSIS — N40.1 BENIGN PROSTATIC HYPERPLASIA WITH URINARY FREQUENCY: ICD-10-CM

## 2021-03-15 DIAGNOSIS — R35.0 BENIGN PROSTATIC HYPERPLASIA WITH URINARY FREQUENCY: ICD-10-CM

## 2021-03-15 DIAGNOSIS — G26 EXTRAPYRAMIDAL AND MOVEMENT DISORDERS IN DISEASES CLASSIFIED ELSEWHERE: ICD-10-CM

## 2021-03-15 PROCEDURE — 99214 OFFICE O/P EST MOD 30 MIN: CPT | Mod: 95 | Performed by: FAMILY MEDICINE

## 2021-03-15 RX ORDER — SILDENAFIL 25 MG/1
25 TABLET, FILM COATED ORAL DAILY PRN
Qty: 30 TABLET | Refills: 1 | Status: SHIPPED | OUTPATIENT
Start: 2021-03-15

## 2021-03-15 RX ORDER — DIVALPROEX SODIUM 500 MG/1
1000 TABLET, EXTENDED RELEASE ORAL AT BEDTIME
Qty: 90 TABLET | Refills: 3 | Status: SHIPPED | OUTPATIENT
Start: 2021-03-15

## 2021-03-15 RX ORDER — BENZTROPINE MESYLATE 2 MG/1
2 TABLET ORAL DAILY PRN
Qty: 30 TABLET | Refills: 2 | Status: SHIPPED | OUTPATIENT
Start: 2021-03-15 | End: 2023-07-19

## 2021-03-15 RX ORDER — CLONAZEPAM 0.5 MG/1
TABLET ORAL
Qty: 90 TABLET | Refills: 0 | Status: SHIPPED | OUTPATIENT
Start: 2021-03-15

## 2021-03-15 RX ORDER — DIVALPROEX SODIUM 500 MG/1
2000 TABLET, EXTENDED RELEASE ORAL AT BEDTIME
Qty: 120 TABLET | Refills: 2 | Status: SHIPPED | OUTPATIENT
Start: 2021-03-15 | End: 2021-03-15

## 2021-03-15 RX ORDER — AMLODIPINE BESYLATE 5 MG/1
7.5 TABLET ORAL DAILY
Qty: 90 TABLET | Refills: 3 | Status: SHIPPED | OUTPATIENT
Start: 2021-03-15 | End: 2022-03-14

## 2021-03-15 RX ORDER — TAMSULOSIN HYDROCHLORIDE 0.4 MG/1
0.4 CAPSULE ORAL DAILY
Qty: 30 CAPSULE | Refills: 0 | Status: SHIPPED | OUTPATIENT
Start: 2021-03-15 | End: 2021-07-30

## 2021-03-15 RX ORDER — OLANZAPINE 20 MG/1
40 TABLET ORAL AT BEDTIME
Qty: 60 TABLET | Refills: 0 | Status: SHIPPED | OUTPATIENT
Start: 2021-03-15

## 2021-03-15 NOTE — PROGRESS NOTES
Family Medicine Telephone Visit Note     Chief Complaint   Patient presents with     Hospital F/U     ED Follow up: [facility] 3/10 for chest pain, cough, vomitting, feeling improved      Refill Request     Patient requesting refills on all medications             HPI   Patients name: Delroy  Appointment start time:  10:12 AM    ED followup: pt had presented for SOB, fever. Diagnosed with PNA and given abx. Now improved. COVID negative.  Requesting refill on most of his medications. Last had physical on 12/17.      Current Outpatient Medications   Medication Sig Dispense Refill     acetaminophen (TYLENOL) 500 MG tablet Take 2 tablets (1,000 mg) by mouth every 6 hours as needed for mild pain 100 tablet 0     amLODIPine (NORVASC) 5 MG tablet Take 1.5 tablets (7.5 mg) by mouth daily 90 tablet 3     aspirin 81 MG tablet Take 1 tablet (81 mg) by mouth daily 90 tablet 3     benztropine (COGENTIN) 2 MG tablet Take 1 tablet (2 mg) by mouth daily as needed 30 tablet 2     cholecalciferol (VITAMIN D3) 17707 UNITS capsule Take 1 capsule (50,000 Units) by mouth once a week 10 capsule 0     clonazePAM (KLONOPIN) 0.5 MG tablet Take 1 tab po q AM and take 2 tabs po q HS. 90 tablet 0     divalproex (DEPAKOTE ER) 500 MG 24 hr tablet Take 4 tablets (2,000 mg) by mouth At Bedtime 120 tablet 2     hydrOXYzine (ATARAX) 25 MG tablet Take 25 mg by mouth 2 times daily.       Multiple Vitamin (MULTIVITAMINS PO) Take 1 tablet by mouth daily.       OLANZapine (ZYPREXA) 20 MG tablet Take 2 tablets (40 mg) by mouth At Bedtime Please see MD before next refill 60 tablet 0     sildenafil (VIAGRA) 25 MG tablet Take 1 tablet (25 mg) by mouth as needed 1 hour before needed 30 tablet 1     tamsulosin (FLOMAX) 0.4 MG capsule Take 1 capsule (0.4 mg) by mouth daily 30 capsule 0     Menthol, Topical Analgesic, 10 % GEL Externally apply topically 2 times daily as needed (pain) (Patient not taking: Reported on 12/17/2020) 70.8 g 0     Allergies   Allergen  Reactions     Haldol [Haloperidol]      Throat swelling     Risperdal [Risperidone]      Throat swelling              Review of Systems:     Constitutional, HEENT, cardiovascular, pulmonary, gi and gu systems are negative, except as otherwise noted.         Physical Exam:     There were no vitals taken for this visit.  Estimated body mass index is 19.39 kg/m  as calculated from the following:    Height as of 10/2/19: 1.829 m (6').    Weight as of 10/2/19: 64.9 kg (143 lb).    Exam:  Constitutional: healthy, alert and no distress  Psychiatric: mentation appears normal and affect normal/bright          Assessment and Plan   1. Benign essential hypertension  - amLODIPine (NORVASC) 5 MG tablet; Take 1.5 tablets (7.5 mg) by mouth daily  Dispense: 90 tablet; Refill: 3    2. 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  Dispense: 90 tablet; Refill: 0    3. Paranoid schizophrenia, chronic condition (H)  - OLANZapine (ZYPREXA) 20 MG tablet; Take 2 tablets (40 mg) by mouth At Bedtime Please see MD before next refill  Dispense: 60 tablet; Refill: 0  - divalproex sodium extended-release (DEPAKOTE ER) 500 MG 24 hr tablet; Take 2 tablets (1,000 mg) by mouth At Bedtime  Dispense: 90 tablet; Refill: 3    4. Benign prostatic hyperplasia with urinary frequency  - tamsulosin (FLOMAX) 0.4 MG capsule; Take 1 capsule (0.4 mg) by mouth daily  Dispense: 30 capsule; Refill: 0    5. Drug-induced erectile dysfunction  - sildenafil (VIAGRA) 25 MG tablet; Take 1 tablet (25 mg) by mouth daily as needed (1 hour before sex) 1 hour before needed  Dispense: 30 tablet; Refill: 1    Refilled medications as requested. Pt instructed to come for in person visit to review med list and chronic medical problems  with PCP.    After Visit Information:  Patient declined AVS     No follow-ups on file.    Appointment end time: 10:32  This is a telephone visit that took 20 minutes.      Clinician location:  [facility] RENAN Knapp DO

## 2021-03-22 DIAGNOSIS — M79.18 MUSCULOSKELETAL PAIN: Primary | ICD-10-CM

## 2021-03-22 NOTE — TELEPHONE ENCOUNTER
Patient has brought in a tylenol 325mg bottle from another pharmacy that has no refills. In the chart it only shows 500mg. Please review patients chart and send a new rx for one or the other. Thanks!    LAST FILLED DATE: n/a  LAST FILLED STRENGTH: 325 MG  LAST FILLED QTY: n/a  LAST OFFICE VISIT: 03-    Haleigh JEWELL CPhT @    [facility]  [address] [place] [address]  Phone: [phone]  Fax: [phone]

## 2021-03-23 RX ORDER — ACETAMINOPHEN 500 MG
1000 TABLET ORAL EVERY 6 HOURS PRN
Qty: 100 TABLET | Refills: 0 | Status: SHIPPED | OUTPATIENT
Start: 2021-03-23 | End: 2022-01-13

## 2021-03-30 ENCOUNTER — TELEPHONE (OUTPATIENT)
Dept: FAMILY MEDICINE | Facility: CLINIC | Age: 61
End: 2021-03-30

## 2021-03-30 NOTE — TELEPHONE ENCOUNTER
"When opening a documentation only encounter, be sure to enter in \"Chief Complaint\" Forms and in \" Comments\" Title of form, description if needed.    Delroy is a 61 year old  male  Form received via: Fax  Form now resides in: Provider Parth Medel CMA                  "

## 2021-04-01 NOTE — TELEPHONE ENCOUNTER
Form has been completed by provider.     Form sent out via: Fax to Handi at Fax Number: [phone]  Patient informed: na  Output date: April 1, 2021    Marion Mendes CMA      **Please close the encounter**

## 2021-04-14 PROBLEM — R26.2 IMPAIRED AMBULATION: Status: ACTIVE | Noted: 2021-04-14

## 2021-04-21 ENCOUNTER — DOCUMENTATION ONLY (OUTPATIENT)
Dept: FAMILY MEDICINE | Facility: CLINIC | Age: 61
End: 2021-04-21

## 2021-04-21 NOTE — PROGRESS NOTES
"When opening a documentation only encounter, be sure to enter in \"Chief Complaint\" Forms and in \" Comments\" Title of form, description if needed.    Delroy is a 61 year old  male  Form received via: Fax  Form now resides in: Provider Ready    Marion Mendes CMA                Form has been completed by provider.     Form sent out via: Fax to Handi at Fax Number: [phone]  Patient informed: na  Output date: April 21, 2021    Marion Mendes CMA      **Please close the encounter**      "

## 2021-07-29 DIAGNOSIS — R35.0 BENIGN PROSTATIC HYPERPLASIA WITH URINARY FREQUENCY: ICD-10-CM

## 2021-07-29 DIAGNOSIS — N40.1 BENIGN PROSTATIC HYPERPLASIA WITH URINARY FREQUENCY: ICD-10-CM

## 2021-07-30 RX ORDER — TAMSULOSIN HYDROCHLORIDE 0.4 MG/1
CAPSULE ORAL
Qty: 30 CAPSULE | Refills: 0 | Status: SHIPPED | OUTPATIENT
Start: 2021-07-30 | End: 2023-07-19

## 2021-08-24 ENCOUNTER — DOCUMENTATION ONLY (OUTPATIENT)
Dept: FAMILY MEDICINE | Facility: CLINIC | Age: 61
End: 2021-08-24

## 2021-08-24 NOTE — PROGRESS NOTES
"When opening a documentation only encounter, be sure to enter in \"Chief Complaint\" Forms and in \" Comments\" Title of form, description if needed.    Delroy is a 61 year old  male  Form received via: Fax  Form now resides in: Provider Ready    Cheryl Revlees CMA      Form has been completed by provider.     Form sent out via: Fax to Endorse at Fax Number: [phone]  Patient informed: n/a  Output date: August 25, 2021    Cheryl Reveles CMA      **Please close the encounter**                "

## 2021-11-30 ENCOUNTER — OFFICE VISIT (OUTPATIENT)
Dept: FAMILY MEDICINE | Facility: CLINIC | Age: 61
End: 2021-11-30
Payer: COMMERCIAL

## 2021-11-30 VITALS
HEART RATE: 83 BPM | SYSTOLIC BLOOD PRESSURE: 131 MMHG | OXYGEN SATURATION: 98 % | BODY MASS INDEX: 23.46 KG/M2 | TEMPERATURE: 98.4 F | DIASTOLIC BLOOD PRESSURE: 81 MMHG | WEIGHT: 173 LBS | RESPIRATION RATE: 16 BRPM

## 2021-11-30 DIAGNOSIS — Z23 NEED FOR PROPHYLACTIC VACCINATION AND INOCULATION AGAINST INFLUENZA: ICD-10-CM

## 2021-11-30 DIAGNOSIS — Z00.00 ROUTINE GENERAL MEDICAL EXAMINATION AT A HEALTH CARE FACILITY: Primary | ICD-10-CM

## 2021-11-30 DIAGNOSIS — Z87.891 PERSONAL HISTORY OF NICOTINE DEPENDENCE: ICD-10-CM

## 2021-11-30 DIAGNOSIS — K21.9 GASTROESOPHAGEAL REFLUX DISEASE, UNSPECIFIED WHETHER ESOPHAGITIS PRESENT: ICD-10-CM

## 2021-11-30 LAB
ANION GAP SERPL CALCULATED.3IONS-SCNC: 5 MMOL/L (ref 3–14)
BUN SERPL-MCNC: 14 MG/DL (ref 7–30)
CALCIUM SERPL-MCNC: 8.1 MG/DL (ref 8.5–10.1)
CHLORIDE BLD-SCNC: 113 MMOL/L (ref 94–109)
CHOLEST SERPL-MCNC: 122 MG/DL
CO2 SERPL-SCNC: 26 MMOL/L (ref 20–32)
CREAT SERPL-MCNC: 1.44 MG/DL (ref 0.66–1.25)
CREAT UR-MCNC: 43 MG/DL
FASTING STATUS PATIENT QL REPORTED: NO
GFR SERPL CREATININE-BSD FRML MDRD: 52 ML/MIN/1.73M2
GLUCOSE BLD-MCNC: 76 MG/DL (ref 70–99)
HDLC SERPL-MCNC: 35 MG/DL
LDLC SERPL CALC-MCNC: 42 MG/DL
MICROALBUMIN UR-MCNC: 12 MG/L
MICROALBUMIN/CREAT UR: 27.91 MG/G CR (ref 0–17)
NONHDLC SERPL-MCNC: 87 MG/DL
POTASSIUM BLD-SCNC: 3.6 MMOL/L (ref 3.4–5.3)
SODIUM SERPL-SCNC: 144 MMOL/L (ref 133–144)
TRIGL SERPL-MCNC: 224 MG/DL

## 2021-11-30 PROCEDURE — 80048 BASIC METABOLIC PNL TOTAL CA: CPT | Performed by: STUDENT IN AN ORGANIZED HEALTH CARE EDUCATION/TRAINING PROGRAM

## 2021-11-30 PROCEDURE — 91306 COVID-19,PF,MODERNA (18+ YRS BOOSTER .25ML): CPT | Performed by: STUDENT IN AN ORGANIZED HEALTH CARE EDUCATION/TRAINING PROGRAM

## 2021-11-30 PROCEDURE — 82043 UR ALBUMIN QUANTITATIVE: CPT | Performed by: STUDENT IN AN ORGANIZED HEALTH CARE EDUCATION/TRAINING PROGRAM

## 2021-11-30 PROCEDURE — 80061 LIPID PANEL: CPT | Performed by: STUDENT IN AN ORGANIZED HEALTH CARE EDUCATION/TRAINING PROGRAM

## 2021-11-30 PROCEDURE — G0008 ADMIN INFLUENZA VIRUS VAC: HCPCS | Performed by: STUDENT IN AN ORGANIZED HEALTH CARE EDUCATION/TRAINING PROGRAM

## 2021-11-30 PROCEDURE — 90682 RIV4 VACC RECOMBINANT DNA IM: CPT | Performed by: STUDENT IN AN ORGANIZED HEALTH CARE EDUCATION/TRAINING PROGRAM

## 2021-11-30 PROCEDURE — 99396 PREV VISIT EST AGE 40-64: CPT | Mod: 25 | Performed by: STUDENT IN AN ORGANIZED HEALTH CARE EDUCATION/TRAINING PROGRAM

## 2021-11-30 PROCEDURE — 36415 COLL VENOUS BLD VENIPUNCTURE: CPT | Performed by: STUDENT IN AN ORGANIZED HEALTH CARE EDUCATION/TRAINING PROGRAM

## 2021-11-30 PROCEDURE — 0064A COVID-19,PF,MODERNA (18+ YRS BOOSTER .25ML): CPT | Performed by: STUDENT IN AN ORGANIZED HEALTH CARE EDUCATION/TRAINING PROGRAM

## 2021-11-30 RX ORDER — VALBENAZINE 40 MG/1
CAPSULE ORAL
COMMUNITY
Start: 2021-11-22

## 2021-11-30 NOTE — PROGRESS NOTES
Preceptor Attestation:   Patient seen, evaluated and discussed with the resident. I have verified the content of the note, which accurately reflects my assessment of the patient and the plan of care.   Supervising Physician:  Shannan Cordero MD

## 2021-11-30 NOTE — PROGRESS NOTES
Male Physical Note          HPI       Customized living arrangement - [facility] -  - Keely    Concerns today: Has been having acid reflux, has been throwing up frequently after some of his meals for the past 2 months. Per patient, this coincides with start of Ingrezza.     Patient Active Problem List   Diagnosis     Chronic kidney disease, stage II (mild)     Essential hypertension with goal blood pressure less than 140/90     Schizoaffective disorder (H)     Dysesthesia     Abnormal colonoscopy     Diverticulosis of sigmoid colon     ACP (advance care planning)     S/P catheter ablation of slow pathway     SVT (supraventricular tachycardia) (H)     Hepatitis C virus infection without hepatic coma, unspecified chronicity     Tardive dyskinesia     Vision loss of right eye     Nicotine dependence     Impaired ambulation       Past Medical History:   Diagnosis Date     Arrhythmia     SVT     Chronic kidney disease, stage II (mild)      Paroxysmal supraventricular tachycardia (H)      Unspecified essential hypertension        Previous Medical Care   [facility] for mental health diagnoses      No family history on file.           Review of Systems:     Review of Systems:  CONSTITUTIONAL: NEGATIVE for fever, chills, change in weight  INTEGUMENTARY/SKIN: NEGATIVE for worrisome rashes, moles or lesions  EYES: NEGATIVE for vision changes or irritation  ENT/MOUTH: NEGATIVE for ear, mouth and throat problems  RESP: NEGATIVE for significant cough or SOB  BREAST: NEGATIVE for masses, tenderness or discharge  CV: NEGATIVE for chest pain, palpitations or peripheral edema  GI: NEGATIVE for nausea, abdominal pain, heartburn, or change in bowel habits  : NEGATIVE for frequency, dysuria, or hematuria  MUSCULOSKELETAL: NEGATIVE for significant arthralgias or myalgia  NEURO: NEGATIVE for weakness, dizziness or paresthesias  ENDOCRINE: NEGATIVE for temperature intolerance, skin/hair  changes  HEME/ALLERGY: NEGATIVE for bleeding problems  PSYCHIATRIC: NEGATIVE for changes in mood or affect  Sleep:   Do you snore most or the night (as reported by a family member)? No  Do you feel sleepy or extremely tired during most of the day? No           Social History     Social History     Socioeconomic History     Marital status: Single     Spouse name: Not on file     Number of children: 0     Years of education: Not on file     Highest education level: Not on file   Occupational History     Not on file   Tobacco Use     Smoking status: Current Every Day Smoker     Packs/day: 1.00     Years: 39.00     Pack years: 39.00     Types: Cigarettes     Smokeless tobacco: Never Used   Substance and Sexual Activity     Alcohol use: No     Drug use: No     Sexual activity: Never   Other Topics Concern     Parent/sibling w/ CABG, MI or angioplasty before 65F 55M? Not Asked      Service Not Asked     Blood Transfusions Not Asked     Caffeine Concern Yes     Occupational Exposure Not Asked     Hobby Hazards Not Asked     Sleep Concern Not Asked     Stress Concern Not Asked     Weight Concern Not Asked     Special Diet Not Asked     Back Care Not Asked     Exercise Not Asked     Bike Helmet Not Asked     Seat Belt Not Asked     Self-Exams Not Asked   Social History Narrative    [facility] care coordinator - Nu Jorgensen - has 1 face to face visit per year and prn.    Goes to [facility]     Lives independently with 4-5 other men in a house in [place].    CADI  Maria Antonia, [facility]    Receives PCA services, ILS services (ILS worker Davian), 24hr emergency assitance, metro bus/train card        Has mental health , Keely.     Social Determinants of Health     Financial Resource Strain: Not on file   Food Insecurity: Not on file   Transportation Needs: Not on file   Physical Activity: Not on file   Stress: Not on file   Social Connections: Not on file   Intimate Partner Violence: Not on  file   Housing Stability: Not on file       Marital Status:Cohabiting   Who lives in your household? 3 other men; customized living     Has anyone hurt you physically, for example by pushing, hitting, slapping or kicking you or forcing you to have sex? Denies  Do you feel threatened or controlled by a partner, ex-partner or anyone in your life? Denies    Sexual Health     Sexual concerns: No   STI History: Neg      Recommended Screening     Cholesterol Level (>46 yo or at risk):  Recommended and patient accepted testing.  Colon CA Screening (>50-75 ):  Recommended and patient accepted testing.             Physical Exam:     Vitals: /81   Pulse 83   Temp 98.4  F (36.9  C) (Oral)   Resp 16   Wt 78.5 kg (173 lb)   SpO2 98%   BMI 23.46 kg/m    BMI= Body mass index is 23.46 kg/m .  GENERAL: healthy, alert and no distress  EYES: Eyes grossly normal to inspection, extraocular movements - intact, and PERRL  HENT: ear canals- normal; TMs- normal; Nose- normal; Mouth- no ulcers, no lesions  NECK: no tenderness, no adenopathy, no asymmetry, no masses, no stiffness; thyroid- normal to palpation  RESP: lungs clear to auscultation - no rales, no rhonchi, no wheezes  CV: regular rates and rhythm, normal S1 S2, no S3 or S4 and no murmur, no click or rub -  ABDOMEN: soft, no tenderness, no  hepatosplenomegaly, no masses, normal bowel sounds  MS: extremities- no gross deformities noted, no edema  SKIN: no suspicious lesions, no rashes  NEURO: strength and tone- normal, sensory exam- grossly normal, mentation- intact, speech- normal, reflexes- symmetric  BACK: no CVA tenderness, no paralumbar tenderness  RECTAL- male: no masses, no hemorhoids, Prostate- symmetric, no  nodularity, no masses, no hypertrophy  PSYCH: Alert and oriented times 3; speech- coherent , normal rate and volume; able to articulate logical thoughts, able to abstract reason, no tangential thoughts, no hallucinations or delusions, affect-  normal  LYMPHATICS: ant. cervical- normal, post. cervical- normal, axillary- normal, supraclavicular- normal, inguinal- normal    Assessment and Plan      Delroy was seen today for physical and back pain.    Diagnoses and all orders for this visit:    Routine general medical examination at a health care facility      1. Health Care Maintenance: Normal Physical Exam    PLAN:  1. Lipid panel  2. Screening Colonoscopy  3. Urine albumin/creatinine ratio  4. Patient asked to follow up with primary psychiatrist to reevaluate recent prescription for Ingrezza, given inreased nausea and vomiting since this time  5. Routine follow up in one year      Options for treatment and follow-up care were reviewed with the patient. Delroy Foley and/or guardian engaged in the decision making process and verbalized understanding of the options discussed and agreed with the final plan.    uDyen Angulo MD

## 2021-11-30 NOTE — PATIENT INSTRUCTIONS
Here is the plan from today's visit    1. Routine general medical examination at a health care facility  - Lipid panel; Future  - Basic metabolic panel; Future  - Albumin Random Urine Quantitative with Creat Ratio; Future  - Adult Gastro Ref - Procedure Only; Future  - CT Chest Lung Cancer Scrn Low Dose wo; Future    2. Personal history of nicotine dependence   - CT Chest Lung Cancer Scrn Low Dose wo; Future    *Note to Psychiatrist: Patient has been complaining of daily emesis; possibly related to start of Ingrezza. Please follow up to see if symptoms continue to persist after start of PPI.     Please call or return to clinic if your symptoms don't go away.    Follow up plan  Return in about 4 weeks (around 12/28/2021).     Thank you for coming to [facility] today.  Lab Testing:  **If you had lab testing today and your results are reassuring or normal they will be mailed to you or sent through [employer] within 7 days.   **If the lab tests need quick action we will call you with the results.  The phone number we will call with results is # [phone] (home) . If this is not the best number please call our clinic and change the number.  Medication Refills:  If you need any refills please call your pharmacy and they will contact us.   If you need to  your refill at a new pharmacy, please contact the new pharmacy directly. The new pharmacy will help you get your medications transferred faster.   Scheduling:  If you have any concerns about today's visit or wish to schedule another appointment please call our office during normal business hours [phone] (8-5:00 M-F)   eferrals to [facility] please call [phone].   Mammogram Scheduling [phone]     XRay/CT/Ultrasound/MRI Scheduling [phone]    Medical Concerns:  If you have urgent medical concerns please call [phone] at any time of the day.    Duyen Angulo MD    Preventive Health Recommendations  Male Ages 50 -  64    Yearly exam:             See your health care provider every year in order to  o   Review health changes.   o   Discuss preventive care.    o   Review your medicines if your doctor has prescribed any.     Have a cholesterol test every 5 years, or more frequently if you are at risk for high cholesterol/heart disease.     Have a diabetes test (fasting glucose) every three years. If you are at risk for diabetes, you should have this test more often.     Have a colonoscopy at age 50, or have a yearly FIT test (stool test). These exams will check for colon cancer.      Talk with your health care provider about whether or not a prostate cancer screening test (PSA) is right for you.    You should be tested each year for STDs (sexually transmitted diseases), if you re at risk.     Shots: Get a flu shot each year. Get a tetanus shot every 10 years.     Nutrition:    Eat at least 5 servings of fruits and vegetables daily.     Eat whole-grain bread, whole-wheat pasta and brown rice instead of white grains and rice.     Get adequate Calcium and Vitamin D.     Lifestyle    Exercise for at least 150 minutes a week (30 minutes a day, 5 days a week). This will help you control your weight and prevent disease.     Limit alcohol to one drink per day.     No smoking.     Wear sunscreen to prevent skin cancer.     See your dentist every six months for an exam and cleaning.     See your eye doctor every 1 to 2 years.

## 2022-01-12 DIAGNOSIS — M79.18 MUSCULOSKELETAL PAIN: ICD-10-CM

## 2022-01-13 RX ORDER — ACETAMINOPHEN 500 MG
1000 TABLET ORAL EVERY 6 HOURS PRN
Qty: 100 TABLET | Refills: 0 | Status: SHIPPED | OUTPATIENT
Start: 2022-01-13

## 2022-04-08 ENCOUNTER — TELEPHONE (OUTPATIENT)
Dept: FAMILY MEDICINE | Facility: CLINIC | Age: 62
End: 2022-04-08
Payer: COMMERCIAL

## 2022-04-08 NOTE — TELEPHONE ENCOUNTER
Patient was notified by phone call   [facility] phone call message-patient reporting a symptom:     Symptom: Difficulties:  -Swallowing  -Coughing while drinking water; drooling  -symptoms happening frequently  -Breathing seemed nomal  Same Day Visit Offered: None available at time    Additional comments: This is an FYI....    Eliane went to the patient's home for an OT consult when she noticed the patient's symptoms. When Eilane asked the caregiver how long have these symptoms been going on, the caregiver did not have an exact time-only to say 'it's been happening frequently.'    OK to leave message on voice mail? Yes    Primary language: English      needed? No    Call taken on April 8, 2022 at 10:47 AM by Kathy Issa

## 2022-04-08 NOTE — TELEPHONE ENCOUNTER
RN spoke with Eliane (OT but not associated with any homecare services) who was at patient's residence assessing for assistive technology when she observed that patient was coughing after drinking water, didn't have great lip closure, drooling. OT inquired if this happens frequently, patient denies but patient's caregiver states it's been happening frequently.   As OT does not know patient's baseline and does not follow patient via homecare, OT wanted to notify PCP in case     RN spoke with patient who states that Keely coordinates his appointments. RN inquired about eating and drinking, patient states he is not having any difficulty with eating or drinking.     RN advised that we should follow up in clinic for check-up and he indicated that we should call Keely to coordinate. Per chart review, Keely is patient's , can be reached at  [phone] per note from Dr. Bhandari on 10/2/19. RN routing to CC to assist with appointment scheduling and routing to Dr. Angulo as FYI.     Jerry Uriostegui RN

## 2022-04-12 NOTE — TELEPHONE ENCOUNTER
"4/12/22 CC attempted to reach Keely Edwin Targeted   With [facility] ([phone]) to schedule patient into see  for \"coughng after drinking water, drooling and not having good lip closure (per Eliane OT) CC left a detailed message on Keely's voicemail along with direct number.      Nhung Fontaine  Pronouns: She/Her/Hers  Care Coordinator  [facility]  [phone]     "

## 2022-04-14 NOTE — TELEPHONE ENCOUNTER
4/14/22 CC once again attempted to reach Keely, patient  who takes care of all patient appointments. CC left another message on Keely's voicemail, along with direct number. Forwarding note to RN triage for direction on scheduling patient into be seen.      Nhung Fontaine  Pronouns: She/Her/Hers  Care Coordinator  [facility]  [phone]

## 2022-04-15 NOTE — TELEPHONE ENCOUNTER
4/15/22  sent letter to patient home at the request of .     Nhung Fontaine  Pronouns: She/Her/Hers  Care Coordinator  [facility]  [phone]

## 2022-04-15 NOTE — TELEPHONE ENCOUNTER
"10:17a.m Keely returned CC call this morning. Patient os scheduled with  on 5/5/22 @ 10:40a.m. Pr Keely patient will also need blood draw per Psychiatrist request\".      Nhung Fontaine  Pronouns: She/Her/Hers  Care Coordinator  [facility]  [phone]    "

## 2022-05-05 ENCOUNTER — OFFICE VISIT (OUTPATIENT)
Dept: FAMILY MEDICINE | Facility: CLINIC | Age: 62
End: 2022-05-05
Payer: COMMERCIAL

## 2022-05-05 VITALS
HEART RATE: 93 BPM | DIASTOLIC BLOOD PRESSURE: 67 MMHG | BODY MASS INDEX: 22.74 KG/M2 | OXYGEN SATURATION: 99 % | WEIGHT: 171.6 LBS | HEIGHT: 73 IN | SYSTOLIC BLOOD PRESSURE: 98 MMHG | RESPIRATION RATE: 16 BRPM | TEMPERATURE: 98.3 F

## 2022-05-05 DIAGNOSIS — Z23 ENCOUNTER FOR IMMUNIZATION: ICD-10-CM

## 2022-05-05 DIAGNOSIS — Z23 HIGH PRIORITY FOR 2019-NCOV VACCINE: Primary | ICD-10-CM

## 2022-05-05 DIAGNOSIS — R13.10 DYSPHAGIA, UNSPECIFIED TYPE: ICD-10-CM

## 2022-05-05 DIAGNOSIS — F20.0 PARANOID SCHIZOPHRENIA, CHRONIC CONDITION (H): ICD-10-CM

## 2022-05-05 LAB — VALPROATE SERPL-MCNC: 74 MG/L

## 2022-05-05 PROCEDURE — 91306 COVID-19,PF,MODERNA (18+ YRS BOOSTER .25ML): CPT | Performed by: STUDENT IN AN ORGANIZED HEALTH CARE EDUCATION/TRAINING PROGRAM

## 2022-05-05 PROCEDURE — 0064A COVID-19,PF,MODERNA (18+ YRS BOOSTER .25ML): CPT | Performed by: STUDENT IN AN ORGANIZED HEALTH CARE EDUCATION/TRAINING PROGRAM

## 2022-05-05 PROCEDURE — 90715 TDAP VACCINE 7 YRS/> IM: CPT | Performed by: STUDENT IN AN ORGANIZED HEALTH CARE EDUCATION/TRAINING PROGRAM

## 2022-05-05 PROCEDURE — 99214 OFFICE O/P EST MOD 30 MIN: CPT | Mod: 25 | Performed by: STUDENT IN AN ORGANIZED HEALTH CARE EDUCATION/TRAINING PROGRAM

## 2022-05-05 PROCEDURE — 80164 ASSAY DIPROPYLACETIC ACD TOT: CPT | Performed by: STUDENT IN AN ORGANIZED HEALTH CARE EDUCATION/TRAINING PROGRAM

## 2022-05-05 PROCEDURE — 36415 COLL VENOUS BLD VENIPUNCTURE: CPT | Performed by: STUDENT IN AN ORGANIZED HEALTH CARE EDUCATION/TRAINING PROGRAM

## 2022-05-05 PROCEDURE — 90471 IMMUNIZATION ADMIN: CPT | Performed by: STUDENT IN AN ORGANIZED HEALTH CARE EDUCATION/TRAINING PROGRAM

## 2022-05-10 NOTE — PROGRESS NOTES
"  Assessment & Plan     Dysphagia, unspecified type  Episodes of coughing and spitting up when drinking liquids. Ongoing for several months, 1-2 episodes per week, though less frequent since restarting medication for tardive dyskinesia, per psychiatry. Will refer to speech therapy for swallow evaluation.   - Speech Therapy Referral; Future    Paranoid schizophrenia, chronic condition (H)  Valproic acid 500mg q24h maintenance. Valproic acid level requested by patient's psychiatrist.   - Valproic acid; Future  - Valproic acid    Encounter for immunization  High priority for 2019-nCoV vaccine  - COVID-19,PF,MODERNA (18+ Yrs BOOSTER .25mL)  - TDAP VACCINE (Adacel, Boostrix)  [[id number]]    Duyen Angulo MD  [facility] RENAN Potts is a 62 year old who presents for the following health issues     HPI     Hx of cognitive impairment, paranoid schizophrenia, and tardive dyskinesia. Presenting with complaint of dysphagia, primarily with liquids. Ongoing for several months, and improving since resuming medication for TD. Episodes have been intermittent, 1-2 times weekly, and less frequent with TD medication, ingrezza.       Review of Systems    ROS: 10 point ROS neg other than the symptoms noted above in the HPI.      Objective    BP 98/67   Pulse 93   Temp 98.3  F (36.8  C) (Oral)   Resp 16   Ht 1.854 m (6' 1\")   Wt 77.8 kg (171 lb 9.6 oz)   SpO2 99%   BMI 22.64 kg/m    Body mass index is 22.64 kg/m .  Physical Exam  Constitutional:       General: He is not in acute distress.     Appearance: He is well-developed. He is not diaphoretic.   HENT:      Head: Atraumatic.   Eyes:      Conjunctiva/sclera: Conjunctivae normal.   Cardiovascular:      Rate and Rhythm: Normal rate and regular rhythm.      Pulses: Normal pulses.      Heart sounds: No murmur heard.  Pulmonary:      Effort: Pulmonary effort is normal. No respiratory distress.      Breath sounds: No wheezing or rales. "   Musculoskeletal:         General: Normal range of motion.      Cervical back: Normal range of motion.   Skin:     General: Skin is warm.   Neurological:      Mental Status: He is alert and oriented to person, place, and time.      Comments: Tardive movements of mouth   Psychiatric:         Mood and Affect: Mood normal.         Behavior: Behavior normal.         Thought Content: Thought content normal.         Judgment: Judgment normal.

## 2022-05-19 NOTE — PROGRESS NOTES
Preceptor Attestation:  Patient seen and evaluated in person. I discussed the patient with the resident. I have verified the content of the note, which accurately reflects my assessment of the patient and the plan of care.   Supervising Physician:  Floridalma Manjarrez DO

## 2022-06-09 ENCOUNTER — TELEPHONE (OUTPATIENT)
Dept: FAMILY MEDICINE | Facility: CLINIC | Age: 62
End: 2022-06-09

## 2022-06-09 DIAGNOSIS — Z12.11 COLON CANCER SCREENING: Primary | ICD-10-CM

## 2022-06-09 DIAGNOSIS — Z12.2 SCREENING FOR LUNG CANCER: ICD-10-CM

## 2022-06-09 DIAGNOSIS — Z87.891 PERSONAL HISTORY OF NICOTINE DEPENDENCE: ICD-10-CM

## 2022-06-09 NOTE — TELEPHONE ENCOUNTER
[facility] phone call message - order or referral request for patient:     Order or referral being requested: Colonoscopy and CT Scan [facility]       Additional Comments: Keely patients  called and said that patient needs a referral for colonoscopy and CT scan. Call back number for Keely is [phone].    OK to leave a message on voice mail? Yes    Primary language: English      needed? No    Call taken on June 9, 2022 at 1:49 PM by Zunilda Del Real

## 2022-07-01 NOTE — TELEPHONE ENCOUNTER
RN called  Keely to discuss that endoscopy order was placed per Dr. Angulo.    Jennifer Mckinney RN

## 2022-07-07 DIAGNOSIS — G24.01 DYSKINESIA, TARDIVE: ICD-10-CM

## 2022-07-07 DIAGNOSIS — F31.74 BIPOLAR DISORDER, IN FULL REMISSION, MOST RECENT EPISODE MANIC (H): Primary | ICD-10-CM

## 2022-08-19 ENCOUNTER — TELEPHONE (OUTPATIENT)
Dept: GASTROENTEROLOGY | Facility: CLINIC | Age: 62
End: 2022-08-19

## 2022-08-19 NOTE — TELEPHONE ENCOUNTER
Screening Questions    BlueKIND OF PREP RedLOCATION [review exclusion criteria] GreenSEDATION TYPE    N Have you had a positive covid test in the last 90 days?   If yes, what date?     Y Do you have a legal guardian or medical Power of ?  Are you able to give consent for your medical care?  (Sedation review/consideration needed)    1. N Are you active on mychart?     2. MEDICA What insurance is in the chart?      3.    RODY KEITA Ordering/Referring Provider:     4.    22.6 BMI [BMI OVER 40-EXTENDED PREP]  If greater than 40 review exclusion criteria [PAC APPT IF (MAC) @ [facility]]      5.  Respiratory Screening:  [If yes to any of the following HOSPITAL setting only]     N      Do you use daily home oxygen?   N      Do you have mod to severe Obstructive Sleep Apnea?  [OKAY @ [facility] SH PH RI]   N      Do you have Pulmonary Hypertension?      N      Do you have UNCONTROLLED asthma?        6.    N Have you had a heart or lung transplant?       7.    N Are you currently on dialysis? [If yes, G-PREP & HOSPITAL setting only]     8.    Y Do you have chronic kidney disease? [If yes, G-PREP]    9.    N Have you had a stroke or Transient ischemic attack (TIA - aka  mini stroke ) within 6 months? (If yes, please review exclusion criteria)         N  In the past 6 months, have you had any heart related issues including cardiomyopathy or heart attack?          N  If yes, did it require cardiac stenting or other implantable device?       10   N Do you have any implantable devices in your body (pacemaker, defib, LVAD)? (If yes, please review exclusion criteria)    11.  N Do you take nitroglycerin?          N If yes, how often?  (If yes, HOSPITAL setting ONLY)    12.  N Are you currently taking any blood thinners?           [IF YES, INFORM PATIENT TO FOLLOW UP W/ ORDERING PROVIDER FOR BRIDGING INSTRUCTIONS]     13.  N Do you take Phentermine?      Yes-> Hold for 7 days before procedure.  Please consult your  prescribing provider if you have questions about holding this medication.    14.  N Are you taking any prescription pain medications on a routine schedule? [If yes, EXTENDED PREP.] [If yes, MAC]    15.  N  Do you have a diagnosis of diabetes?[If yes, G-PREP]    16.       [FEMALES] are you currently pregnant?                If yes, how many weeks?     17.  N Do you have any chemical dependencies such as alcohol, street drugs, or methadone? [If yes, MAC]    18.  Y Do you have any history of post-traumatic stress syndrome, severe anxiety or history of psychosis? [If yes, MAC]    19.  Y Do you transfer independently? (If NO, please HOSPITAL setting  only)     20.  N  On a regular basis do you go 3-5 days between bowel movements?[ If yes, EXTENDED PREP.]    21.   Preferred LOCAL Pharmacy for Pre Prescription:          [id number] - [place], [place] - [address]                            Scheduling Details      CLINTON Caller:   (Please ask for phone number if not scheduled by patient)    Type of Procedure Scheduled: Lower Endoscopy [Colonoscopy]    GOLYTELY Which Colonoscopy Prep was Sent:   GEO CF PATIENTS & GROEN'S PATIENTS NEEDS EXTENDED PREP    Date of Procedure: 11/8  Surgeon: JAZZ  [place] [facility] [place]    Sedation Type: MAC    Conscious Sedation- Needs  for 6 hours after the procedure  MAC/General-Needs  for 24 hours after procedure    N Pre-op Required at [facility], [place], [place] and OR for MAC sedation:        (Advise patient they will need a pre-op WITH IN 30 DAYS prior to procedure -)      Y Informed patient they will need an adult          Cannot take any type of public or medical transportation alone    Y Confirmed Nurse will call to complete assessment     HOME Pre-Procedure Covid test to be completed at [facility] or Externally:      Additional comments:

## 2022-09-29 ENCOUNTER — LAB (OUTPATIENT)
Dept: LAB | Facility: CLINIC | Age: 62
End: 2022-09-29
Payer: COMMERCIAL

## 2022-09-29 DIAGNOSIS — G24.01 DYSKINESIA, TARDIVE: ICD-10-CM

## 2022-09-29 DIAGNOSIS — F31.74 BIPOLAR DISORDER, IN FULL REMISSION, MOST RECENT EPISODE MANIC (H): ICD-10-CM

## 2022-09-29 LAB
ALBUMIN SERPL BCG-MCNC: 4 G/DL (ref 3.5–5.2)
ALP SERPL-CCNC: 60 U/L (ref 40–129)
ALT SERPL W P-5'-P-CCNC: <5 U/L (ref 10–50)
AST SERPL W P-5'-P-CCNC: 16 U/L (ref 10–50)
BILIRUB DIRECT SERPL-MCNC: <0.2 MG/DL (ref 0–0.3)
BILIRUB SERPL-MCNC: 0.2 MG/DL
PROT SERPL-MCNC: 6.8 G/DL (ref 6.4–8.3)
VALPROATE SERPL-MCNC: 49 UG/ML

## 2022-09-29 PROCEDURE — 80164 ASSAY DIPROPYLACETIC ACD TOT: CPT

## 2022-09-29 PROCEDURE — 36415 COLL VENOUS BLD VENIPUNCTURE: CPT | Performed by: PATHOLOGY

## 2022-09-29 PROCEDURE — 80076 HEPATIC FUNCTION PANEL: CPT | Performed by: PATHOLOGY

## 2022-11-01 ENCOUNTER — TELEPHONE (OUTPATIENT)
Dept: GASTROENTEROLOGY | Facility: CLINIC | Age: 62
End: 2022-11-01

## 2022-11-01 DIAGNOSIS — Z12.11 ENCOUNTER FOR SCREENING COLONOSCOPY: Primary | ICD-10-CM

## 2022-11-01 RX ORDER — BISACODYL 5 MG/1
TABLET, DELAYED RELEASE ORAL
Qty: 4 TABLET | Refills: 0 | Status: SHIPPED | OUTPATIENT
Start: 2022-11-01

## 2022-11-01 NOTE — TELEPHONE ENCOUNTER
Called patient in attempts to complete pre assessment for upcoming colonoscopy  procedure on 11/8/22. Per patient they would like this information to be reviewed with caregiver Keely. [phone].    Attempted to contact Keely regarding upcoming colonoscopy  procedure on 11/8/22 for pre assessment questions. No answer.     Left message to return call to [phone] #4    Covid test scheduled/. No Discuss at home rapid antigen COVID test 1-2 days prior to procedure.    Arrival time: 0815    Facility location: [facility]     Sedation type: MAC     Indication for procedure: screening     Anticoagulants: ASA     Bowel prep recommendation: Golytely d/t mg citrate recall    Golytely script sent to [id number] - [place], [place] - [address] [place] [address]. Prep instructions sent via letter at time of scheduling.     Diabetic? no Inform patient to hold oral diabetic medications day of procedure if applicable.     Shannan Gomes RN

## 2022-11-02 NOTE — TELEPHONE ENCOUNTER
Keely returned call.     Pre assessment questions completed for upcoming colonoscopy  procedure scheduled on 11/8/22    COVID policy reviewed. Patient to complete rapid antigen test one to two days before their scheduled procedure. Patient to bring photo of the results when they come in for their procedure.    Reviewed procedural arrival time 0815 and facility location [facility].    Designated  policy reviewed. Instructed to have someone stay 24 hours post procedure.     Reviewed Golytely prep instructions. No fiber/iron supplements or foods that contain nuts/seeds 7 days prior to procedure.     Keely requesting prep instructions be sent via email as it was not received via letter at time of scheduling.     Communication via unencrypted e-mail. This includes prep instructions.  Keely acknowledges that they have agreed to accept the risks and receive unencrypted communications for this incidence.   Keely verbalized understanding and had no questions or concerns at this time.    Shannan Gomes RN

## 2022-11-07 ENCOUNTER — ANESTHESIA EVENT (OUTPATIENT)
Dept: SURGERY | Facility: AMBULATORY SURGERY CENTER | Age: 62
End: 2022-11-07
Payer: COMMERCIAL

## 2022-11-07 ASSESSMENT — ENCOUNTER SYMPTOMS: DYSRHYTHMIAS: 1

## 2022-11-07 NOTE — ANESTHESIA PREPROCEDURE EVALUATION
Anesthesia Pre-Procedure Evaluation    Patient: Delroy Foley   MRN: [id number] : 1960        Procedure : Procedure(s):  COLONOSCOPY          Past Medical History:   Diagnosis Date     Arrhythmia     SVT     Chronic kidney disease, stage II (mild)      Paroxysmal supraventricular tachycardia (H)      Unspecified essential hypertension       Past Surgical History:   Procedure Laterality Date     COLONOSCOPY       COLONOSCOPY N/A 2018    Procedure: COLONOSCOPY;  colonoscopy;  Surgeon: Keely Rai MD;  Location: [facility]     COLONOSCOPY N/A 2018    Procedure: COMBINED COLONOSCOPY THRU STOMA, BIOPSY BY SNARE;  colonoscopy;  Surgeon: Keely Rai MD;  Location: [facility]     EP ABLATION / EP STUDIES  9/17/15    attempted SVT ablation, unable to induce      Allergies   Allergen Reactions     Haldol [Haloperidol]      Throat swelling     Risperdal [Risperidone]      Throat swelling      Social History     Tobacco Use     Smoking status: Every Day     Packs/day: 1.00     Years: 39.00     Pack years: 39.00     Types: Cigarettes     Smokeless tobacco: Never   Substance Use Topics     Alcohol use: No      Wt Readings from Last 1 Encounters:   22 77.8 kg (171 lb 9.6 oz)        Anesthesia Evaluation            ROS/MED HX  ENT/Pulmonary:       Neurologic:       Cardiovascular:     (+) hypertension-----dysrhythmias, Other, Irregular Heartbeat/Palpitations,     METS/Exercise Tolerance:     Hematologic:       Musculoskeletal:       GI/Hepatic:     (+) bowel prep, hepatitis type C, liver disease,     Renal/Genitourinary:     (+) renal disease, type: CRI, Pt does not require dialysis,     Endo:       Psychiatric/Substance Use:       Infectious Disease:       Malignancy:       Other:               OUTSIDE LABS:  CBC:   Lab Results   Component Value Date    WBC 8.1 10/29/2020    WBC 7.2 2015    HGB 12.2 (L) 10/29/2020    HGB 13.6 2016    HCT 37.6 (L) 10/29/2020    HCT  43.1 07/14/2016     10/29/2020     09/17/2015     BMP:   Lab Results   Component Value Date     11/30/2021     10/29/2020    POTASSIUM 3.6 11/30/2021    POTASSIUM 3.5 10/29/2020    CHLORIDE 113 (H) 11/30/2021    CHLORIDE 114 (H) 10/29/2020    CO2 26 11/30/2021    CO2 22 10/29/2020    BUN 14 11/30/2021    BUN 8 10/29/2020    CR 1.44 (H) 11/30/2021    CR 1.32 (H) 10/29/2020    GLC 76 11/30/2021    GLC 97 10/29/2020     COAGS:   Lab Results   Component Value Date    PTT 30 09/17/2015    INR 1.00 09/17/2015     POC:   Lab Results   Component Value Date     (H) 07/02/2015     HEPATIC:   Lab Results   Component Value Date    ALBUMIN 4.0 09/29/2022    PROTTOTAL 6.8 09/29/2022    ALT <5 (L) 09/29/2022    AST 16 09/29/2022    GGT 9 10/07/2006    ALKPHOS 60 09/29/2022    BILITOTAL 0.2 09/29/2022    GLEN 4 (L) 10/31/2005     OTHER:   Lab Results   Component Value Date    A1C 5.0 01/23/2015    СВЕТЛАНА 8.1 (L) 11/30/2021    PHOS 3.2 05/29/2015    MAG 2.1 10/31/2005    LIPASE 42 06/28/2009    AMYLASE 71 06/28/2009    TSH 0.18 (L) 10/29/2020    T4 1.03 10/29/2020    T3 79 10/07/2006       Anesthesia Plan    ASA Status:  2      Anesthesia Type: MAC.     - Reason for MAC: immobility needed              Consents    Anesthesia Plan(s) and associated risks, benefits, and realistic alternatives discussed. Questions answered and patient/representative(s) expressed understanding.     - Discussed: Risks, Benefits and Alternatives for BOTH SEDATION and the PROCEDURE were discussed     - Discussed with:  Patient      - Extended Intubation/Ventilatory Support Discussed: No.      - Patient is DNR/DNI Status: No    Use of blood products discussed: No .     Postoperative Care    Pain management: Oral pain medications.        Comments:           H&P reviewed: Unable to attach H&P to encounter due to EHR limitations. H&P Update: appropriate H&P reviewed, patient examined. No interval changes since H&P (within 30  days).         Camden Lyons MD, MD

## 2022-11-08 ENCOUNTER — HOSPITAL ENCOUNTER (OUTPATIENT)
Facility: AMBULATORY SURGERY CENTER | Age: 62
Discharge: HOME | End: 2022-11-08
Attending: INTERNAL MEDICINE
Payer: COMMERCIAL

## 2022-11-08 ENCOUNTER — ANESTHESIA (OUTPATIENT)
Dept: SURGERY | Facility: AMBULATORY SURGERY CENTER | Age: 62
End: 2022-11-08
Payer: COMMERCIAL

## 2022-11-08 VITALS
HEIGHT: 73 IN | TEMPERATURE: 97.7 F | RESPIRATION RATE: 16 BRPM | WEIGHT: 160 LBS | SYSTOLIC BLOOD PRESSURE: 166 MMHG | BODY MASS INDEX: 21.2 KG/M2 | HEART RATE: 68 BPM | DIASTOLIC BLOOD PRESSURE: 110 MMHG | OXYGEN SATURATION: 99 %

## 2022-11-08 VITALS — HEART RATE: 73 BPM

## 2022-11-08 LAB — COLONOSCOPY: NORMAL

## 2022-11-08 PROCEDURE — 45380 COLONOSCOPY AND BIOPSY: CPT | Mod: 59,PT

## 2022-11-08 PROCEDURE — 88305 TISSUE EXAM BY PATHOLOGIST: CPT | Mod: TC | Performed by: INTERNAL MEDICINE

## 2022-11-08 PROCEDURE — 45385 COLONOSCOPY W/LESION REMOVAL: CPT | Mod: PT

## 2022-11-08 RX ORDER — NALOXONE HYDROCHLORIDE 0.4 MG/ML
0.2 INJECTION, SOLUTION INTRAMUSCULAR; INTRAVENOUS; SUBCUTANEOUS
Status: DISCONTINUED | OUTPATIENT
Start: 2022-11-08 | End: 2022-11-09 | Stop reason: HOSPADM

## 2022-11-08 RX ORDER — PROPOFOL 10 MG/ML
INJECTION, EMULSION INTRAVENOUS PRN
Status: DISCONTINUED | OUTPATIENT
Start: 2022-11-08 | End: 2022-11-08

## 2022-11-08 RX ORDER — LIDOCAINE 40 MG/G
CREAM TOPICAL
Status: DISCONTINUED | OUTPATIENT
Start: 2022-11-08 | End: 2022-11-08 | Stop reason: HOSPADM

## 2022-11-08 RX ORDER — ONDANSETRON 2 MG/ML
4 INJECTION INTRAMUSCULAR; INTRAVENOUS EVERY 6 HOURS PRN
Status: DISCONTINUED | OUTPATIENT
Start: 2022-11-08 | End: 2022-11-09 | Stop reason: HOSPADM

## 2022-11-08 RX ORDER — FLUMAZENIL 0.1 MG/ML
0.2 INJECTION, SOLUTION INTRAVENOUS
Status: ACTIVE | OUTPATIENT
Start: 2022-11-08 | End: 2022-11-08

## 2022-11-08 RX ORDER — PROCHLORPERAZINE MALEATE 10 MG
10 TABLET ORAL EVERY 6 HOURS PRN
Status: DISCONTINUED | OUTPATIENT
Start: 2022-11-08 | End: 2022-11-09 | Stop reason: HOSPADM

## 2022-11-08 RX ORDER — PROPOFOL 10 MG/ML
INJECTION, EMULSION INTRAVENOUS CONTINUOUS PRN
Status: DISCONTINUED | OUTPATIENT
Start: 2022-11-08 | End: 2022-11-08

## 2022-11-08 RX ORDER — SODIUM CHLORIDE, SODIUM LACTATE, POTASSIUM CHLORIDE, CALCIUM CHLORIDE 600; 310; 30; 20 MG/100ML; MG/100ML; MG/100ML; MG/100ML
INJECTION, SOLUTION INTRAVENOUS CONTINUOUS PRN
Status: DISCONTINUED | OUTPATIENT
Start: 2022-11-08 | End: 2022-11-08

## 2022-11-08 RX ORDER — NALOXONE HYDROCHLORIDE 0.4 MG/ML
0.4 INJECTION, SOLUTION INTRAMUSCULAR; INTRAVENOUS; SUBCUTANEOUS
Status: DISCONTINUED | OUTPATIENT
Start: 2022-11-08 | End: 2022-11-09 | Stop reason: HOSPADM

## 2022-11-08 RX ORDER — SIMETHICONE
LIQUID (ML) MISCELLANEOUS PRN
Status: DISCONTINUED | OUTPATIENT
Start: 2022-11-08 | End: 2022-11-08 | Stop reason: HOSPADM

## 2022-11-08 RX ORDER — LIDOCAINE HYDROCHLORIDE 20 MG/ML
INJECTION, SOLUTION INFILTRATION; PERINEURAL PRN
Status: DISCONTINUED | OUTPATIENT
Start: 2022-11-08 | End: 2022-11-08

## 2022-11-08 RX ORDER — ONDANSETRON 2 MG/ML
4 INJECTION INTRAMUSCULAR; INTRAVENOUS
Status: DISCONTINUED | OUTPATIENT
Start: 2022-11-08 | End: 2022-11-08 | Stop reason: HOSPADM

## 2022-11-08 RX ORDER — ONDANSETRON 4 MG/1
4 TABLET, ORALLY DISINTEGRATING ORAL EVERY 6 HOURS PRN
Status: DISCONTINUED | OUTPATIENT
Start: 2022-11-08 | End: 2022-11-09 | Stop reason: HOSPADM

## 2022-11-08 RX ADMIN — LIDOCAINE HYDROCHLORIDE 60 MG: 20 INJECTION, SOLUTION INFILTRATION; PERINEURAL at 09:20

## 2022-11-08 RX ADMIN — PROPOFOL 150 MCG/KG/MIN: 10 INJECTION, EMULSION INTRAVENOUS at 09:20

## 2022-11-08 RX ADMIN — SODIUM CHLORIDE, SODIUM LACTATE, POTASSIUM CHLORIDE, CALCIUM CHLORIDE: 600; 310; 30; 20 INJECTION, SOLUTION INTRAVENOUS at 09:11

## 2022-11-08 RX ADMIN — PROPOFOL 50 MG: 10 INJECTION, EMULSION INTRAVENOUS at 09:20

## 2022-11-08 NOTE — ANESTHESIA POSTPROCEDURE EVALUATION
Patient: Delroy Foley    Procedure: Procedure(s):  COLONOSCOPY, WITH POLYPECTOMY       Anesthesia Type:  MAC    Note:  Disposition: Outpatient   Postop Pain Control: Uneventful            Sign Out: Well controlled pain   PONV: No   Neuro/Psych: Uneventful            Sign Out: Acceptable/Baseline neuro status   Airway/Respiratory: Uneventful            Sign Out: Acceptable/Baseline resp. status   CV/Hemodynamics: Uneventful            Sign Out: Acceptable CV status; No obvious hypovolemia; No obvious fluid overload   Other NRE: NONE   DID A NON-ROUTINE EVENT OCCUR? No           Last vitals:  Vitals Value Taken Time   /110 11/08/22 1100   Temp 36.5  C (97.7  F) 11/08/22 1100   Pulse 68 11/08/22 1100   Resp 16 11/08/22 1100   SpO2 99 % 11/08/22 1100       Electronically Signed By: Camden Lyons MD, MD  November 8, 2022  2:30 PM

## 2022-11-08 NOTE — LETTER
"November 9, 2022      Delroy Foley  [address]  [place] [address]        Dear ,    I am writing to let you know the results of the polyps that were removed at the time of your colonoscopy.  The polyps returned as \"adenomatous polyps\".  An adenoma is considered a pre-cancerous polyp.  There was no cancer in your polyps.  Your polyps were removed, however you are at risk to grow more adenomatous polyps in the future.    Based on current guidelines, because of the number, size, and type of adenomatous polyps that you had, I recommend that you repeat a colonoscopy to screen for new polyps in two to three (2-3) years.  Of course should you develop any symptoms (such as unintended weight loss, blood in your stool or change in bowel pattern), you should let myself or your primary care doctor know as you may need an earlier procedure.      If you have any questions, please don't hesitate to contact the Gastroenterology nurse at [phone].    Sincerely,    Elza Proctor MD    [facility]  Division of Gastroenterology, Hepatology and Nutrition        Resulted Orders   Surgical Pathology Exam   Result Value Ref Range    Case Report       Surgical Pathology Report                         Case: [id number]                                  Authorizing Provider:  Elza Proctor         Collected:           11/08/2022 09:39 AM                                 MD Ana                                                                Ordering Location:     [facility] OR  Received:            11/08/2022 10:39 AM                                 [place]                                                                  Pathologist:           Hima Monique DO                                                            Specimens:   A) - Other, Cecal Polyp                                                                             B) - Other, IC " "Valve Polyp                                                                          C) - Other, Transverse Colon Polyp x 3                                                              D) - Other, Descending Polyp x 2                                                                     E) - Other, Transverse Polyp                                                               Final Diagnosis       A.  COLON, CECUM, POLYP  - Tubulovillous adenoma  - No high-grade dysplasia or malignancy identified    B.  COLON, IC VALVE, POLYP  - Tubular adenoma  - No high-grade dysplasia or malignancy identified    C.  COLON, TRANSVERSE, POLYPS:  - Tubular adenomas  - No high-grade dysplasia or malignancy identified    D.  COLON, DESCENDING, POLYPS:  - Fragments of tubular and tubulovillous adenomas  - No high-grade dysplasia or malignancy identified    E.  COLON, TRANSVERSE, POLYP:  - Tubulovillous adenoma  - No high-grade dysplasia or malignancy identified          Clinical Information       Colon polyps      Gross Description       A(1). Other, Cecal Polyp :  The specimen is received in formalin with proper patient identification, labeled \"cecal polyp\".  The specimen consists of multiple segments of tan soft tissue ranging from 0.2 to 1.2 cm in greatest dimension.  It is entirely submitted in cassettes A1-A2.  B(2). Other, IC Valve Polyp:  The specimen is received in formalin with proper patient identification, labeled \"IC valve polyp\".  The specimen consists of 2 pieces of tan soft tissue ranging from 0.5 to 0.6 cm in greatest dimension.  It is entirely submitted in cassette B1.   C(3). Other, Transverse Colon Polyp x 3:  The specimen is received in formalin with proper patient identification, labeled \"transverse colon polyp x3\".  The specimen consists of 5 pieces of tan soft tissue ranging from 0.2 to 0.5 cm in greatest dimension.  It is entirely submitted in cassette C1.   D(4). Other, Descending Polyp x 2:  The specimen is " "received in formalin with proper patient identification, labeled \"descending polyp x2\".  The specimen consists of 5 pieces of tan soft tissue ranging from 0.2 to 0.6 cm in greatest dimension.  It is entirely submitted in cassette D1.   E(5). Other, Transverse Polyp:  The specimen is received in formalin with proper patient identification, labeled \"transverse polyp\".  The specimen consists of 1 tan soft polyp measuring 0.9 cm in greatest dimension.  The margin is inked black, it is sectioned and entirely submitted in cassette E1.       Microscopic Description       Microscopic examination was performed.        Performing Labs       The technical component of this testing was completed at [facility]      Case Images                 "

## 2022-11-08 NOTE — ANESTHESIA CARE TRANSFER NOTE
Patient: Delroy Foley    Procedure: Procedure(s):  COLONOSCOPY, WITH POLYPECTOMY       Diagnosis: Colon cancer screening [Z12.11]  Diagnosis Additional Information: No value filed.    Anesthesia Type:   MAC     Note:    Oropharynx: oropharynx clear of all foreign objects and spontaneously breathing  Level of Consciousness: awake  Oxygen Supplementation: room air    Independent Airway: airway patency satisfactory and stable  Dentition: dentition unchanged  Vital Signs Stable: post-procedure vital signs reviewed and stable  Report to RN Given: handoff report given  Patient transferred to: Phase II    Handoff Report: Identifed the Patient, Identified the Reponsible Provider, Reviewed the pertinent medical history, Discussed the surgical course, Reviewed Intra-OP anesthesia mangement and issues during anesthesia, Set expectations for post-procedure period and Allowed opportunity for questions and acknowledgement of understanding      Vitals:  Vitals Value Taken Time   /112 11/08/22 1029   Temp 35.9  C (96.6  F) 11/08/22 1029   Pulse 94 11/08/22 1029   Resp 16 11/08/22 1029   SpO2 93 % 11/08/22 1029       Electronically Signed By: YANA WONG  November 8, 2022  10:31 AM

## 2022-11-08 NOTE — H&P
Delroy Foley  [id number]  male  62 year old      Reason for procedure/surgery: surveillance    Patient Active Problem List   Diagnosis     Chronic kidney disease, stage II (mild)     Essential hypertension with goal blood pressure less than 140/90     Schizoaffective disorder (H)     Dysesthesia     Abnormal colonoscopy     Diverticulosis of sigmoid colon     ACP (advance care planning)     S/P catheter ablation of slow pathway     SVT (supraventricular tachycardia) (H)     Hepatitis C virus infection without hepatic coma, unspecified chronicity     Tardive dyskinesia     Vision loss of right eye     Nicotine dependence     Impaired ambulation       Past Surgical History:    Past Surgical History:   Procedure Laterality Date     COLONOSCOPY  2013     COLONOSCOPY N/A 1/11/2018    Procedure: COLONOSCOPY;  colonoscopy;  Surgeon: Keely Rai MD;  Location:  GI     COLONOSCOPY N/A 2/22/2018    Procedure: COMBINED COLONOSCOPY THRU STOMA, BIOPSY BY SNARE;  colonoscopy;  Surgeon: Keely Rai MD;  [facility]     EP ABLATION / EP STUDIES  9/17/15    attempted SVT ablation, unable to induce       Past Medical History:   Past Medical History:   Diagnosis Date     Arrhythmia     SVT     Chronic kidney disease, stage II (mild)      Paroxysmal supraventricular tachycardia (H)      Unspecified essential hypertension        Social History:   Social History     Tobacco Use     Smoking status: Every Day     Packs/day: 1.00     Years: 39.00     Pack years: 39.00     Types: Cigarettes     Smokeless tobacco: Never   Substance Use Topics     Alcohol use: No       Family History: No family history on file.    Allergies:   Allergies   Allergen Reactions     Haldol [Haloperidol]      Throat swelling     Risperdal [Risperidone]      Throat swelling       Active Medications:   Current Outpatient Medications   Medication Sig Dispense Refill     acetaminophen (TYLENOL) 500 MG tablet Take 2 tablets  (1,000 mg) by mouth every 6 hours as needed for mild pain 100 tablet 0     amLODIPine (NORVASC) 5 MG tablet TAKE 1.5 TABLETS BY MOUTH ONCE DAILY. 90 tablet 3     aspirin 81 MG tablet Take 1 tablet (81 mg) by mouth daily 90 tablet 3     benztropine (COGENTIN) 2 MG tablet Take 1 tablet (2 mg) by mouth daily as needed for movement disorders (Patient not taking: Reported on 5/5/2022) 30 tablet 2     bisacodyl (DULCOLAX) 5 MG EC tablet Take as directed. One day before exam take 2 tablets at 3 PM. Take 2 tablets at 11 PM. 4 tablet 0     cholecalciferol (VITAMIN D3) 26902 UNITS capsule Take 1 capsule (50,000 Units) by mouth once a week 10 capsule 0     clonazePAM (KLONOPIN) 0.5 MG tablet Take 1 tab po q AM and take 2 tabs po q HS. 90 tablet 0     divalproex sodium extended-release (DEPAKOTE ER) 500 MG 24 hr tablet Take 2 tablets (1,000 mg) by mouth At Bedtime 90 tablet 3     hydrOXYzine (ATARAX) 25 MG tablet Take 25 mg by mouth 2 times daily.       INGREZZA 40 MG capsule        Menthol, Topical Analgesic, 10 % GEL Externally apply topically 2 times daily as needed (pain) (Patient not taking: No sig reported) 70.8 g 0     Multiple Vitamin (MULTIVITAMINS PO) Take 1 tablet by mouth daily.       OLANZapine (ZYPREXA) 20 MG tablet Take 2 tablets (40 mg) by mouth At Bedtime Please see MD before next refill 60 tablet 0     omeprazole (PRILOSEC) 20 MG DR capsule Take 2 capsules (40 mg) by mouth daily (Patient not taking: Reported on 5/5/2022) 30 capsule 1     omeprazole (PRILOSEC) 40 MG DR capsule TAKE ONE CAPSULE (40MG) BY MOUTH ONCE DAILY 15 capsule 1     polyethylene glycol (GOLYTELY) 236 g suspension Take as directed. One day before exam fill the jug with water. Cover and shake until well mixed. At 6 PM start drinking an 8oz glass of mixture every 15 minutes until jug is 1/2 empty. Store remainder in the refrigerator.  At 11 PM Start drinking the other half of the Golytely jug. Drink one 8-ounce glass every 15 minutes until the  "jug is empty. 4000 mL 0     sildenafil (VIAGRA) 25 MG tablet Take 1 tablet (25 mg) by mouth daily as needed (1 hour before sex) 1 hour before needed 30 tablet 1     tamsulosin (FLOMAX) 0.4 MG capsule TAKE ONE CAPSULE BY MOUTH ONCE DAILY 30 capsule 0       Systemic Review:   ROS otherwise negative    Physical Examination:   Vital Signs: BP (!) 138/115 (BP Location: Right arm)   Pulse 81   Temp 98  F (36.7  C) (Temporal)   Resp 18   Ht 1.854 m (6' 1\")   Wt 72.6 kg (160 lb)   SpO2 99%   BMI 21.11 kg/m    GENERAL: healthy, alert and no distress  HENT: oropharynx clear and oral mucous membranes moist, Mallampati II  NECK: Normal ROM  RESP: lungs clear to auscultation - no rales, rhonchi or wheezes  CV: regular rate and rhythm, normal S1 S2,   ABDOMEN: soft, nontender, and bowel sounds normal  MS: no gross musculoskeletal defects noted, no edema    Plan: Appropriate to proceed as scheduled.      Elza Proctor MD  11/8/2022    PCP:  Duyen Angulo    "

## 2022-11-09 LAB
PATH REPORT.COMMENTS IMP SPEC: NORMAL
PATH REPORT.COMMENTS IMP SPEC: NORMAL
PATH REPORT.FINAL DX SPEC: NORMAL
PATH REPORT.GROSS SPEC: NORMAL
PATH REPORT.MICROSCOPIC SPEC OTHER STN: NORMAL
PATH REPORT.RELEVANT HX SPEC: NORMAL
PHOTO IMAGE: NORMAL

## 2022-11-09 PROCEDURE — 88305 TISSUE EXAM BY PATHOLOGIST: CPT | Mod: 26 | Performed by: PATHOLOGY

## 2022-11-14 ENCOUNTER — TRANSFERRED RECORDS (OUTPATIENT)
Dept: HEALTH INFORMATION MANAGEMENT | Facility: CLINIC | Age: 62
End: 2022-11-14

## 2023-01-24 ENCOUNTER — LAB (OUTPATIENT)
Dept: LAB | Facility: CLINIC | Age: 63
End: 2023-01-24
Payer: COMMERCIAL

## 2023-01-24 DIAGNOSIS — F31.74 MANIC DISORDER, RECURRENT EPISODE, IN FULL REMISSION (H): Primary | ICD-10-CM

## 2023-01-24 LAB
ALBUMIN SERPL BCG-MCNC: 3.9 G/DL (ref 3.5–5.2)
ALP SERPL-CCNC: 60 U/L (ref 40–129)
ALT SERPL W P-5'-P-CCNC: 16 U/L (ref 10–50)
AST SERPL W P-5'-P-CCNC: 19 U/L (ref 10–50)
BILIRUB DIRECT SERPL-MCNC: <0.2 MG/DL (ref 0–0.3)
BILIRUB SERPL-MCNC: 0.3 MG/DL
PROT SERPL-MCNC: 6.6 G/DL (ref 6.4–8.3)
VALPROATE SERPL-MCNC: 73.7 UG/ML

## 2023-01-24 PROCEDURE — 80076 HEPATIC FUNCTION PANEL: CPT | Performed by: PATHOLOGY

## 2023-01-24 PROCEDURE — 36415 COLL VENOUS BLD VENIPUNCTURE: CPT | Performed by: PATHOLOGY

## 2023-01-24 PROCEDURE — 80164 ASSAY DIPROPYLACETIC ACD TOT: CPT

## 2023-02-15 ENCOUNTER — DOCUMENTATION ONLY (OUTPATIENT)
Dept: FAMILY MEDICINE | Facility: CLINIC | Age: 63
End: 2023-02-15
Payer: COMMERCIAL

## 2023-02-15 NOTE — PROGRESS NOTES
"When opening a documentation only encounter, be sure to enter in \"Chief Complaint\" Forms and in \" Comments\" Title of form, description if needed.    Delroy is a 63 year old  male  Form received via: Fax  Form now resides in: Provider Ready    Marcie Rose      Form has been completed by provider.     Form sent out via: Fax to [employer]  at Fax Number: [phone]  Patient informed: n/a  Output date: February 15, 2023    Marcie Rose      **Please close the encounter**                "

## 2023-04-12 ENCOUNTER — DOCUMENTATION ONLY (OUTPATIENT)
Dept: FAMILY MEDICINE | Facility: CLINIC | Age: 63
End: 2023-04-12
Payer: COMMERCIAL

## 2023-04-12 NOTE — PROGRESS NOTES
"When opening a documentation only encounter, be sure to enter in \"Chief Complaint\" Forms and in \" Comments\" Title of form, description if needed.  Form has been completed by provider.     Form sent out via: Fax to [employer] at Fax Number: [phone]  Patient informed: n/a  Output date: April 28, 2023    Ann Paulson     **Please close the encounter**    Delroy is a 63 year old  male  Form received via: Fax  Form now resides in: Provider Ready    Ann Paulson               "

## 2023-05-25 ENCOUNTER — DOCUMENTATION ONLY (OUTPATIENT)
Dept: FAMILY MEDICINE | Facility: CLINIC | Age: 63
End: 2023-05-25
Payer: COMMERCIAL

## 2023-05-25 NOTE — PROGRESS NOTES
"When opening a documentation only encounter, be sure to enter in \"Chief Complaint\" Forms and in \" Comments\" Title of form, description if needed.    Form has been completed by provider.     Form sent out via: Fax to [employer] at Fax Number: [phone]  Patient informed: n/a  Output date: June 7, 2023    Ann Paulson     **Please close the encounter**      Delroy is a 63 year old  male  Form received via: Fax  Form now resides in: Provider Ready    Ann Paulson                 "

## 2023-07-03 ENCOUNTER — DOCUMENTATION ONLY (OUTPATIENT)
Dept: FAMILY MEDICINE | Facility: CLINIC | Age: 63
End: 2023-07-03
Payer: COMMERCIAL

## 2023-07-03 NOTE — PROGRESS NOTES
Form has been completed by provider.     Form sent out via: Fax to [facility] at Fax Number: [phone]  Patient informed: n/a  Output date: July 3, 2023    Ann Paulson     **Please close the encounter**

## 2023-07-19 ENCOUNTER — OFFICE VISIT (OUTPATIENT)
Dept: FAMILY MEDICINE | Facility: CLINIC | Age: 63
End: 2023-07-19
Payer: COMMERCIAL

## 2023-07-19 VITALS
RESPIRATION RATE: 16 BRPM | OXYGEN SATURATION: 98 % | HEIGHT: 71 IN | DIASTOLIC BLOOD PRESSURE: 93 MMHG | BODY MASS INDEX: 26.57 KG/M2 | HEART RATE: 85 BPM | TEMPERATURE: 98.2 F | SYSTOLIC BLOOD PRESSURE: 132 MMHG | WEIGHT: 189.8 LBS

## 2023-07-19 DIAGNOSIS — Z00.00 ENCOUNTER FOR MEDICARE ANNUAL WELLNESS EXAM: Primary | ICD-10-CM

## 2023-07-19 DIAGNOSIS — N40.1 BENIGN PROSTATIC HYPERPLASIA WITH URINARY FREQUENCY: ICD-10-CM

## 2023-07-19 DIAGNOSIS — R35.0 BENIGN PROSTATIC HYPERPLASIA WITH URINARY FREQUENCY: ICD-10-CM

## 2023-07-19 DIAGNOSIS — I10 BENIGN ESSENTIAL HYPERTENSION: ICD-10-CM

## 2023-07-19 DIAGNOSIS — Z87.891 PERSONAL HISTORY OF TOBACCO USE: ICD-10-CM

## 2023-07-19 DIAGNOSIS — N18.2 CHRONIC KIDNEY DISEASE, STAGE II (MILD): ICD-10-CM

## 2023-07-19 PROCEDURE — 90677 PCV20 VACCINE IM: CPT | Performed by: STUDENT IN AN ORGANIZED HEALTH CARE EDUCATION/TRAINING PROGRAM

## 2023-07-19 PROCEDURE — G0296 VISIT TO DETERM LDCT ELIG: HCPCS | Performed by: STUDENT IN AN ORGANIZED HEALTH CARE EDUCATION/TRAINING PROGRAM

## 2023-07-19 PROCEDURE — 36415 COLL VENOUS BLD VENIPUNCTURE: CPT | Performed by: STUDENT IN AN ORGANIZED HEALTH CARE EDUCATION/TRAINING PROGRAM

## 2023-07-19 PROCEDURE — 80061 LIPID PANEL: CPT | Performed by: STUDENT IN AN ORGANIZED HEALTH CARE EDUCATION/TRAINING PROGRAM

## 2023-07-19 PROCEDURE — G0009 ADMIN PNEUMOCOCCAL VACCINE: HCPCS | Performed by: STUDENT IN AN ORGANIZED HEALTH CARE EDUCATION/TRAINING PROGRAM

## 2023-07-19 PROCEDURE — 99396 PREV VISIT EST AGE 40-64: CPT | Mod: 25 | Performed by: STUDENT IN AN ORGANIZED HEALTH CARE EDUCATION/TRAINING PROGRAM

## 2023-07-19 PROCEDURE — 80048 BASIC METABOLIC PNL TOTAL CA: CPT | Performed by: STUDENT IN AN ORGANIZED HEALTH CARE EDUCATION/TRAINING PROGRAM

## 2023-07-19 RX ORDER — MIRTAZAPINE 7.5 MG/1
7.5 TABLET, FILM COATED ORAL AT BEDTIME
COMMUNITY
Start: 2023-06-27

## 2023-07-19 RX ORDER — DIVALPROEX SODIUM 250 MG/1
250 TABLET, EXTENDED RELEASE ORAL AT BEDTIME
COMMUNITY
Start: 2022-09-04

## 2023-07-19 RX ORDER — TAMSULOSIN HYDROCHLORIDE 0.4 MG/1
0.4 CAPSULE ORAL DAILY
Qty: 30 CAPSULE | Refills: 1 | Status: SHIPPED | OUTPATIENT
Start: 2023-07-19 | End: 2023-09-07

## 2023-07-19 RX ORDER — AMLODIPINE BESYLATE 5 MG/1
TABLET ORAL
Qty: 135 TABLET | Refills: 3 | Status: SHIPPED | OUTPATIENT
Start: 2023-07-19 | End: 2024-09-09

## 2023-07-19 ASSESSMENT — ACTIVITIES OF DAILY LIVING (ADL)
CURRENT_FUNCTION: NEEDS ASSISTANCE
CURRENT_FUNCTION: BATHING REQUIRES ASSISTANCE
CURRENT_FUNCTION: LAUNDRY REQUIRES ASSISTANCE
CURRENT_FUNCTION: MEDICATION ADMINISTRATION REQUIRES ASSISTANCE
CURRENT_FUNCTION: NEEDS ASSISTANCE
CURRENT_FUNCTION: HOUSEWORK REQUIRES ASSISTANCE
CURRENT_FUNCTION: PREPARING MEALS REQUIRES ASSISTANCE
CURRENT_FUNCTION: NEEDS ASSISTANCE
CURRENT_FUNCTION: TRANSPORTATION REQUIRES ASSISTANCE
CURRENT_FUNCTION: MONEY MANAGEMENT REQUIRES ASSISTANCE

## 2023-07-19 ASSESSMENT — ENCOUNTER SYMPTOMS
COUGH: 0
DIZZINESS: 0
HEARTBURN: 0
SHORTNESS OF BREATH: 0
PALPITATIONS: 0
NAUSEA: 0
FREQUENCY: 1
PARESTHESIAS: 0
EYE PAIN: 0
FEVER: 0
HEMATURIA: 0
WEAKNESS: 0
MYALGIAS: 0
DYSURIA: 0
ARTHRALGIAS: 0
HEADACHES: 0
HEMATOCHEZIA: 1
JOINT SWELLING: 0
NERVOUS/ANXIOUS: 0
CHILLS: 0
SORE THROAT: 0
DIARRHEA: 0
CONSTIPATION: 1
ABDOMINAL PAIN: 0

## 2023-07-19 ASSESSMENT — PAIN SCALES - GENERAL: PAINLEVEL: WORST PAIN (10)

## 2023-07-19 NOTE — PROGRESS NOTES
"SUBJECTIVE:   CC: Delroy is an 63 year old who presents for preventative health visit.       7/19/2023     1:02 PM   Additional Questions   Roomed by Ann   Accompanied by self     HPI     Frequent urination, possibly due to not taking flomax? Urinary retention?    Annual Wellness Visit    Patient has been advised of split billing requirements and indicates understanding: Yes     Are you in the first 12 months of your Medicare Part B coverage?  No    Physical Health:    In general, how would you rate your overall physical health? good    Outside of work, how many days during the week do you exercise?2-3 days/week    Outside of work, approximately how many minutes a day do you exercise?30-45 minutes    If you drink alcohol do you typically have >3 drinks per day or >7 drinks per week? No    Do you usually eat at least 4 servings of fruit and vegetables a day, include whole grains & fiber and avoid regularly eating high fat or \"junk\" foods? Yes    Do you have any problems taking medications regularly? No    Do you have any side effects from medications? none    Needs assistance for the following daily activities: preparing meals, housework and money management    Which of the following safety concerns are present in your home?  none identified     Hearing impairment: No    In the past 6 months, have you been bothered by leaking of urine? no    Mental Health:    In general, how would you rate your overall mental or emotional health? good  PHQ-2 Score: 0    Do you feel safe in your environment? Yes    Have you ever done Advance Care Planning? (For example, a Health Directive, POLST, or a discussion with a medical provider or your loved ones about your wishes)? No, advance care planning information given to patient to review.  Patient declined advance care planning discussion at this time.    Fall risk:  Fallen 2 or more times in the past year?: No  Any fall with injury in the past year?: No  click delete button to " remove this line now  Cognitive Screening: Not appropriate due to mental handicap    Do you have sleep apnea, excessive snoring or daytime drowsiness?: no    Social History     Tobacco Use     Smoking status: Every Day     Packs/day: 1.00     Years: 39.00     Pack years: 39.00     Types: Cigarettes     Smokeless tobacco: Never   Substance Use Topics     Alcohol use: No             7/19/2023     1:06 PM   Alcohol Use   Prescreen: >3 drinks/day or >7 drinks/week? No     Do you have a current opioid prescription? No  Do you use any other controlled substances or medications that are not prescribed by a provider? None    Current providers sharing in care for this patient include:   Patient Care Team:  Duyen Gusman MD as PCP - General (Student in organized health care education/training program)  Tenisha Oleary RN as Clinic Care Coordinator  Duyen Gusman MD as Assigned PCP    Patient has been advised of split billing requirements and indicates understanding: Yes    Have you ever done Advance Care Planning? (For example, a Health Directive, POLST, or a discussion with a medical provider or your loved ones about your wishes): No, advance care planning information given to patient to review.  Patient declined advance care planning discussion at this time.    Social History     Tobacco Use     Smoking status: Every Day     Packs/day: 1.00     Years: 39.00     Pack years: 39.00     Types: Cigarettes     Smokeless tobacco: Never   Substance Use Topics     Alcohol use: No             7/19/2023     1:06 PM   Alcohol Use   Prescreen: >3 drinks/day or >7 drinks/week? No       Last PSA: No results found for: PSA    Reviewed orders with patient. Reviewed health maintenance and updated orders accordingly - Yes  Lab work is in process    Reviewed and updated as needed this visit by clinical staff   Tobacco  Allergies  Meds              Reviewed and updated as needed this visit by Provider       Med Hx  Surg Hx  Fam Hx    "          Review of Systems  CONSTITUTIONAL: NEGATIVE for fever, chills, change in weight  INTEGUMENTARY/SKIN: NEGATIVE for worrisome rashes, moles or lesions  EYES: NEGATIVE for vision changes or irritation  ENT: NEGATIVE for ear, mouth and throat problems  RESP: NEGATIVE for significant cough or SOB  CV: NEGATIVE for chest pain, palpitations or peripheral edema  GI: NEGATIVE for nausea, abdominal pain, heartburn, or change in bowel habits   male: negative for dysuria, hematuria, decreased urinary stream, erectile dysfunction, urethral discharge, positive for urinary frequency  MUSCULOSKELETAL: NEGATIVE for significant arthralgias or myalgia  NEURO: NEGATIVE for weakness, dizziness or paresthesias  PSYCHIATRIC: NEGATIVE for changes in mood or affect    OBJECTIVE:   BP (!) 132/93   Pulse 85   Temp 98.2  F (36.8  C) (Oral)   Resp 16   Ht 1.803 m (5' 11\")   Wt 86.1 kg (189 lb 12.8 oz)   SpO2 98%   BMI 26.47 kg/m      Physical Exam  GENERAL: healthy, alert and no distress  EYES: Eyes grossly normal to inspection, PERRL and conjunctivae and sclerae normal  HENT: ear canals and TM's normal, nose and mouth without ulcers or lesions  NECK: no adenopathy, no asymmetry, masses, or scars and thyroid normal to palpation  RESP: lungs clear to auscultation - no rales, rhonchi or wheezes  CV: regular rate and rhythm, normal S1 S2, no S3 or S4, no murmur, click or rub, no peripheral edema and peripheral pulses strong  ABDOMEN: soft, nontender, no hepatosplenomegaly, no masses and bowel sounds normal  MS: no gross musculoskeletal defects noted, no edema  SKIN: no suspicious lesions or rashes  NEURO: Normal strength and tone, mentation intact and speech normal    Diagnostic Test Results:  Labs reviewed in Epic    ASSESSMENT/PLAN:       ICD-10-CM    1. Encounter for Medicare annual wellness exam  Z00.00       2. Personal history of tobacco use  Z87.891 Prof fee: Shared Decision Making for Lung Cancer Screening     CT Chest " Lung Cancer Scrn Low Dose wo      3. Chronic kidney disease, stage II (mild)  N18.2 BASIC METABOLIC PANEL     Albumin Random Urine Quantitative with Creat Ratio     BASIC METABOLIC PANEL      4. Benign prostatic hyperplasia with urinary frequency  N40.1 tamsulosin (FLOMAX) 0.4 MG capsule    R35.0       5. Benign essential hypertension  I10 Lipid panel reflex to direct LDL Non-fasting     amLODIPine (NORVASC) 5 MG tablet     Lipid panel reflex to direct LDL Non-fasting            COUNSELING:   Reviewed preventive health counseling, as reflected in patient instructions       Regular exercise       Healthy diet/nutrition        He reports that he has been smoking cigarettes. He has a 39.00 pack-year smoking history. He has never used smokeless tobacco.  Nicotine/Tobacco Cessation Plan:   Information offered: Patient not interested at this time    Duyen Gusman MD  [facility]    Lung Cancer Screening Shared Decision Making Visit     Delroy Foley, a 63 year old male, is eligible for lung cancer screening    History   Smoking Status     Every Day     Packs/day: 1.00     Years: 39.00     Types: Cigarettes   Smokeless Tobacco     Never       I have discussed with patient the risks and benefits of screening for lung cancer with low-dose CT.     The risks include:    radiation exposure: one low dose chest CT has as much ionizing radiation as about 15 chest x-rays, or 6 months of background radiation living in [place]      false positives: most findings/nodules are NOT cancer, but some might still require additional diagnostic evaluation, including biopsy    over-diagnosis: some slow growing cancers that might never have been clinically significant will be detected and treated unnecessarily     The benefit of early detection of lung cancer is contingent upon adherence to annual screening or more frequent follow up if indicated.     Furthermore, to benefit from screening, Delroy must be willing and  able to undergo diagnostic procedures, if indicated. Although no specific guide is available for determining severity of comorbidities, it is reasonable to withhold screening in patients who have greater mortality risk from other diseases.     We did discuss that the best way to prevent lung cancer is to not smoke.    Some patients may value a numeric estimation of lung cancer risk when evaluating if lung cancer screening is right for them, here is one calculator:    ShouldIScreen

## 2023-07-19 NOTE — PATIENT INSTRUCTIONS
Patient Education   Personalized Prevention Plan  You are due for the preventive services outlined below.  Your care team is available to assist you in scheduling these services.  If you have already completed any of these items, please share that information with your care team to update in your medical record.  Health Maintenance Due   Topic Date Due    Zoster (Shingles) Vaccine (1 of 2) Never done    LUNG CANCER SCREENING  03/10/2022    COVID-19 Vaccine (5 - Moderna series) 06/30/2022    Annual Wellness Visit  11/30/2022    Basic Metabolic Panel  11/30/2022    Cholesterol Lab  11/30/2022    Kidney Microalbumin Urine Test  11/30/2022        Lung Cancer Screening   Frequently Asked Questions  If you are at high-risk for lung cancer, getting screened with low-dose computed tomography (LDCT) every year can help save your life. This handout offers answers to some of the most common questions about lung cancer screening. If you have other questions, please call [phone] ([phone]).     What is it?  Lung cancer screening uses special X-ray technology to create an image of your lung tissue. The exam is quick and easy and takes less than 10 seconds. We don t give you any medicine or use any needles. You can eat before and after the exam. You don t need to change your clothes as long as the clothing on your chest doesn t contain metal. But, you do need to be able to hold your breath for at least 6 seconds during the exam.    What is the goal of lung cancer screening?  The goal of lung cancer screening is to save lives. Many times, lung cancer is not found until a person starts having physical symptoms. Lung cancer screening can help detect lung cancer in the earliest stages when it may be easier to treat.    Who should be screened for lung cancer?  We suggest lung cancer screening for anyone who is at high-risk for lung cancer. You are in the high-risk group if you:     are between the ages of 55 and 79,  and   have smoked at least 1 pack of cigarettes a day for 20 or more years, and   still smoke or have quit within the past 15 years.    However, if you have a new cough or shortness of breath, you should talk to your doctor before being screened.    Why does it matter if I have symptoms?  Certain symptoms can be a sign that you have a condition in your lungs that should be checked and treated by your doctor. These symptoms include fever, chest pain, a new or changing cough, shortness of breath that you have never felt before, coughing up blood or unexplained weight loss. Having any of these symptoms can greatly affect the results of lung cancer screening.       Should all smokers get an LDCT lung cancer screening exam?  It depends. Lung cancer screening is for a very specific group of men and women who have a history of heavy smoking over a long period of time (see  Who should be screened for lung cancer  above).  I am in the high-risk group, but have been diagnosed with cancer in the past. Is LDCT lung cancer screening right for me?  In some cases, you should not have LDCT lung screening, such as when your doctor is already following your cancer with CT scan studies. Your doctor will help you decide if LDCT lung screening is right for you.  Do I need to have a screening exam every year?  Yes. If you are in the high-risk group described earlier, you should get an LDCT lung cancer screening exam every year until you are 79, or are no longer willing or able to undergo screening and possible procedures to diagnose and treat lung cancer.  How effective is LDCT at preventing death from lung cancer?  Studies have shown that LDCT lung cancer screening can lower the risk of death from lung cancer by 20 percent in people who are at high-risk.  What are the risks?  There are some risks and limitations of LDCT lung cancer screening. We want to make sure you understand the risks and benefits, so please let us know if you have  any questions. Your doctor may want to talk with you more about these risks.   Radiation exposure: As with any exam that uses radiation, there is a very small increased risk of cancer. The amount of radiation in LDCT is small--about the same amount a person would get from a mammogram. Your doctor orders the exam when he or she feels the potential benefits outweigh the risks.   False negatives: No test is perfect, including LDCT. It is possible that you may have a medical condition, including lung cancer, that is not found during your exam. This is called a false negative result.   False positives and more testing: LDCT very often finds something in the lung that could be cancer, but in fact is not. This is called a false positive result. False positive tests often cause anxiety. To make sure these findings are not cancer, you may need to have more tests. These tests will be done only if you give us permission. Sometimes patients need a treatment that can have side effects, such as a biopsy. For more information on false positives, see  What can I expect from the results?    Findings not related to lung cancer: Your LDCT exam also takes pictures of areas of your body next to your lungs. In a very small number of cases, the CT scan will show an abnormal finding in one of these areas, such as your kidneys, adrenal glands, liver or thyroid. This finding may not be serious, but you may need more tests. Your doctor can help you decide what other tests you may need, if any.  What can I expect from the results?  About 1 out of 4 LDCT exams will find something that may need more tests. Most of the time, these findings are lung nodules. Lung nodules are very small collections of tissue in the lung. These nodules are very common, and the vast majority--more than 97 percent--are not cancer (benign). Most are normal lymph nodes or small areas of scarring from past infections.  But, if a small lung nodule is found to be cancer,  the cancer can be cured more than 90 percent of the time. To know if the nodule is cancer, we may need to get more images before your next yearly screening exam. If the nodule has suspicious features (for example, it is large, has an odd shape or grows over time), we will refer you to a specialist for further testing.  Will my doctor also get the results?  Yes. Your doctor will get a copy of your results.  Is it okay to keep smoking now that there s a cancer screening exam?  No. Tobacco is one of the strongest cancer-causing agents. It causes not only lung cancer, but other cancers and cardiovascular (heart) diseases as well. The damage caused by smoking builds over time. This means that the longer you smoke, the higher your risk of disease. While it is never too late to quit, the sooner you quit, the better.  Where can I find help to quit smoking?  The best way to prevent lung cancer is to stop smoking. If you have already quit smoking, congratulations and keep it up! For help on quitting smoking, please call [employer] at 3-595-QUITNOW (1-813.124.2601) or the American [employer] Society at 1-632.157.2526 to find local resources near you.  One-on-one health coaching:  If you d prefer to work individually with a health care provider on tobacco cessation, we offer:     Medication Therapy Management:  Our specially trained pharmacists work closely with you and your doctor to help you quit smoking.  Call [phone] or [phone] (toll free).

## 2023-07-20 LAB
ANION GAP SERPL CALCULATED.3IONS-SCNC: 12 MMOL/L (ref 7–15)
BUN SERPL-MCNC: 16.5 MG/DL (ref 8–23)
CALCIUM SERPL-MCNC: 8.8 MG/DL (ref 8.8–10.2)
CHLORIDE SERPL-SCNC: 113 MMOL/L (ref 98–107)
CHOLEST SERPL-MCNC: 127 MG/DL
CREAT SERPL-MCNC: 1.64 MG/DL (ref 0.67–1.17)
DEPRECATED HCO3 PLAS-SCNC: 21 MMOL/L (ref 22–29)
GFR SERPL CREATININE-BSD FRML MDRD: 47 ML/MIN/1.73M2
GLUCOSE SERPL-MCNC: 77 MG/DL (ref 70–99)
HDLC SERPL-MCNC: 42 MG/DL
LDLC SERPL CALC-MCNC: 74 MG/DL
NONHDLC SERPL-MCNC: 85 MG/DL
POTASSIUM SERPL-SCNC: 4.2 MMOL/L (ref 3.4–5.3)
SODIUM SERPL-SCNC: 146 MMOL/L (ref 136–145)
TRIGL SERPL-MCNC: 57 MG/DL

## 2023-07-26 ENCOUNTER — TELEPHONE (OUTPATIENT)
Dept: FAMILY MEDICINE | Facility: CLINIC | Age: 63
End: 2023-07-26

## 2023-07-26 NOTE — TELEPHONE ENCOUNTER
"Warm transfer from . Delroy reports that he received a pneumonia vaccine a couple days ago in his L arm and he began having chest pains on the R side. He reports that it feels like it is getting better but he is wondering if it is from the vaccine. The chest pain does not increase with activity and does not decrease, it stays \"the same\". He denies any other symptoms. RN talked w/preceptor who recommended pt be evaluated in UC to have an EKG to make sure nothing is going on as there are no appts available today in clinic.     RN advised Delroy to be seen in UC and he asked if it was ok to wait and see if it gets better since it is not on his L side. RN advised against that as chest pain is a concerning symptom even if it is on his R side and advised pt to be evaluated in UC. Pt declined going to  and requested an appt at clinic. RN was going to schedule an appt and Delroy asked RN to call his  Keely and schedule the appt with her. RN called Keely, no answer lmtcb.     Please transfer Keely to RN when she calls back.    Teagan Saavedra RN   "

## 2023-08-06 NOTE — TELEPHONE ENCOUNTER
"Request for medication refill:  acetaminophen (TYLENOL) 500 MG tablet    Providers if patient needs an appointment and you are willing to give a one month supply please refill for one month and  send a letter/MyChart using \".SMILLIMITEDREFILL\" .smillimited and route chart to \"P [facility] \" (Giving one month refill in non controlled medications is strongly recommended before denial)    If refill has been denied, meaning absolutely no refills without visit, please complete the smart phrase \".smirxrefuse\" and route it to the \"P [facility] MED REFILLS\"  pool to inform the patient and the pharmacy.    Michelle Castillo        " <<--- Click to launch

## 2023-08-09 ENCOUNTER — ANCILLARY PROCEDURE (OUTPATIENT)
Dept: CT IMAGING | Facility: CLINIC | Age: 63
End: 2023-08-09
Attending: FAMILY MEDICINE
Payer: COMMERCIAL

## 2023-08-09 DIAGNOSIS — Z87.891 PERSONAL HISTORY OF TOBACCO USE: ICD-10-CM

## 2023-08-09 PROCEDURE — 71271 CT THORAX LUNG CANCER SCR C-: CPT | Mod: GC | Performed by: RADIOLOGY

## 2023-09-04 DIAGNOSIS — N40.1 BENIGN PROSTATIC HYPERPLASIA WITH URINARY FREQUENCY: ICD-10-CM

## 2023-09-04 DIAGNOSIS — R35.0 BENIGN PROSTATIC HYPERPLASIA WITH URINARY FREQUENCY: ICD-10-CM

## 2023-09-05 NOTE — TELEPHONE ENCOUNTER
"Request for medication refill:  tamsulosin (FLOMAX) 0.4 MG capsule   Providers if patient needs an appointment and you are willing to give a one month supply please refill for one month and  send a letter/MyChart using \".SMILLIMITEDREFILL\" .smillimited and route chart to \"P [facility] \" (Giving one month refill in non controlled medications is strongly recommended before denial)    If refill has been denied, meaning absolutely no refills without visit, please complete the smart phrase \".smirxrefuse\" and route it to the \"P [facility] MED REFILLS\"  pool to inform the patient and the pharmacy.    Basilia Tomas MA     "

## 2023-09-07 RX ORDER — TAMSULOSIN HYDROCHLORIDE 0.4 MG/1
0.4 CAPSULE ORAL DAILY
Qty: 30 CAPSULE | Refills: 1 | Status: SHIPPED | OUTPATIENT
Start: 2023-09-07 | End: 2023-10-13

## 2023-10-12 DIAGNOSIS — N40.1 BENIGN PROSTATIC HYPERPLASIA WITH URINARY FREQUENCY: ICD-10-CM

## 2023-10-12 DIAGNOSIS — R35.0 BENIGN PROSTATIC HYPERPLASIA WITH URINARY FREQUENCY: ICD-10-CM

## 2023-10-13 RX ORDER — TAMSULOSIN HYDROCHLORIDE 0.4 MG/1
0.4 CAPSULE ORAL DAILY
Qty: 30 CAPSULE | Refills: 1 | Status: SHIPPED | OUTPATIENT
Start: 2023-10-13 | End: 2023-12-26

## 2023-10-13 NOTE — TELEPHONE ENCOUNTER
"Last visit 7/19/23    Request for medication refill:  tamsulosin (FLOMAX) 0.4 MG capsule     Providers if patient needs an appointment and you are willing to give a one month supply please refill for one month and  send a letter/MyChart using \".SMILLIMITEDREFILL\" .smillimited and route chart to \"P [facility] \" (Giving one month refill in non controlled medications is strongly recommended before denial)    If refill has been denied, meaning absolutely no refills without visit, please complete the smart phrase \".smirxrefuse\" and route it to the \"P [facility] MED REFILLS\"  pool to inform the patient and the pharmacy.    Angeli Finnegan, CMA      "

## 2023-12-04 ENCOUNTER — DOCUMENTATION ONLY (OUTPATIENT)
Dept: FAMILY MEDICINE | Facility: CLINIC | Age: 63
End: 2023-12-04
Payer: COMMERCIAL

## 2023-12-04 NOTE — PROGRESS NOTES
"When opening a documentation only encounter, be sure to enter in \"Chief Complaint\" Forms and in \" Comments\" Title of form, description if needed.    Delroy is a 63 year old  male  Form received via: Fax  Form now resides in: Provider Ready    Nina Rojo MA        Form has been completed by provider.     Form sent out via: Fax to [email or web address] at Fax Number: [phone]  Patient informed: N/A  Output date: December 19, 2023    Nina Rojo MA      **Please close the encounter**      "

## 2023-12-23 DIAGNOSIS — N40.1 BENIGN PROSTATIC HYPERPLASIA WITH URINARY FREQUENCY: ICD-10-CM

## 2023-12-23 DIAGNOSIS — R35.0 BENIGN PROSTATIC HYPERPLASIA WITH URINARY FREQUENCY: ICD-10-CM

## 2023-12-26 RX ORDER — TAMSULOSIN HYDROCHLORIDE 0.4 MG/1
0.4 CAPSULE ORAL DAILY
Qty: 30 CAPSULE | Refills: 1 | Status: SHIPPED | OUTPATIENT
Start: 2023-12-26 | End: 2024-02-20

## 2023-12-26 NOTE — TELEPHONE ENCOUNTER
"Request for medication refill:  tamsulosin (FLOMAX) 0.4 MG capsule     Providers if patient needs an appointment and you are willing to give a one month supply please refill for one month and  send a letter/MyChart using \".SMILLIMITEDREFILL\" .smillimited and route chart to \"P [facility] \" (Giving one month refill in non controlled medications is strongly recommended before denial)    If refill has been denied, meaning absolutely no refills without visit, please complete the smart phrase \".smirxrefuse\" and route it to the \"P [facility] MED REFILLS\"  pool to inform the patient and the pharmacy.    Angeli Finnegan, CMA      "

## 2024-01-23 ENCOUNTER — DOCUMENTATION ONLY (OUTPATIENT)
Dept: FAMILY MEDICINE | Facility: CLINIC | Age: 64
End: 2024-01-23
Payer: COMMERCIAL

## 2024-01-23 NOTE — PROGRESS NOTES
Form has been completed by provider.     Form sent out via: Fax to [employer] at Fax Number: [phone]  Patient informed:   Output date: January 23, 2024    Ann Paulson MA      **Please close the encounter**

## 2024-02-18 DIAGNOSIS — N40.1 BENIGN PROSTATIC HYPERPLASIA WITH URINARY FREQUENCY: ICD-10-CM

## 2024-02-18 DIAGNOSIS — R35.0 BENIGN PROSTATIC HYPERPLASIA WITH URINARY FREQUENCY: ICD-10-CM

## 2024-02-19 NOTE — TELEPHONE ENCOUNTER
"Request for medication refill:  tamsulosin (FLOMAX) 0.4 MG capsule     Providers if patient needs an appointment and you are willing to give a one month supply please refill for one month and  send a letter/MyChart using \".SMILLIMITEDREFILL\" .smillimited and route chart to \"P [facility] \" (Giving one month refill in non controlled medications is strongly recommended before denial)    If refill has been denied, meaning absolutely no refills without visit, please complete the smart phrase \".smirxrefuse\" and route it to the \"P [facility] MED REFILLS\"  pool to inform the patient and the pharmacy.    Angeli Finnegan, CMA      "

## 2024-02-20 RX ORDER — TAMSULOSIN HYDROCHLORIDE 0.4 MG/1
0.4 CAPSULE ORAL DAILY
Qty: 30 CAPSULE | Refills: 1 | Status: SHIPPED | OUTPATIENT
Start: 2024-02-20 | End: 2024-04-18

## 2024-04-02 ENCOUNTER — TRANSFERRED RECORDS (OUTPATIENT)
Dept: HEALTH INFORMATION MANAGEMENT | Facility: CLINIC | Age: 64
End: 2024-04-02

## 2024-04-18 DIAGNOSIS — N40.1 BENIGN PROSTATIC HYPERPLASIA WITH URINARY FREQUENCY: ICD-10-CM

## 2024-04-18 DIAGNOSIS — R35.0 BENIGN PROSTATIC HYPERPLASIA WITH URINARY FREQUENCY: ICD-10-CM

## 2024-04-18 RX ORDER — TAMSULOSIN HYDROCHLORIDE 0.4 MG/1
0.4 CAPSULE ORAL DAILY
Qty: 30 CAPSULE | Refills: 1 | Status: SHIPPED | OUTPATIENT
Start: 2024-04-18 | End: 2024-06-13

## 2024-04-18 NOTE — TELEPHONE ENCOUNTER
"Request for medication refill:    tamsulosin (FLOMAX) 0.4 MG capsule     Providers if patient needs an appointment and you are willing to give a one month supply please refill for one month and  send a letter/MyChart using \".SMILLIMITEDREFILL\" .smillimited and route chart to \"P [facility] \" (Giving one month refill in non controlled medications is strongly recommended before denial)    If refill has been denied, meaning absolutely no refills without visit, please complete the smart phrase \".smirxrefuse\" and route it to the \"P [facility] MED REFILLS\"  pool to inform the patient and the pharmacy.    Anna Ozuna MA      "

## 2024-06-13 DIAGNOSIS — R35.0 BENIGN PROSTATIC HYPERPLASIA WITH URINARY FREQUENCY: ICD-10-CM

## 2024-06-13 DIAGNOSIS — N40.1 BENIGN PROSTATIC HYPERPLASIA WITH URINARY FREQUENCY: ICD-10-CM

## 2024-06-13 RX ORDER — TAMSULOSIN HYDROCHLORIDE 0.4 MG/1
0.4 CAPSULE ORAL DAILY
Qty: 30 CAPSULE | Refills: 1 | Status: SHIPPED | OUTPATIENT
Start: 2024-06-13 | End: 2024-08-13

## 2024-06-13 NOTE — TELEPHONE ENCOUNTER
"Request for medication refill:  tamsulosin (FLOMAX) 0.4 MG capsule     Providers if patient needs an appointment and you are willing to give a one month supply please refill for one month and  send a letter/MyChart using \".SMILLIMITEDREFILL\" .smillimited and route chart to \"P [facility] \" (Giving one month refill in non controlled medications is strongly recommended before denial)    If refill has been denied, meaning absolutely no refills without visit, please complete the smart phrase \".smirxrefuse\" and route it to the \"P [facility] MED REFILLS\"  pool to inform the patient and the pharmacy.    Angeli Finnegan, CMA      "

## 2024-07-29 ENCOUNTER — OFFICE VISIT (OUTPATIENT)
Dept: FAMILY MEDICINE | Facility: CLINIC | Age: 64
End: 2024-07-29
Payer: COMMERCIAL

## 2024-07-29 VITALS
HEIGHT: 71 IN | RESPIRATION RATE: 16 BRPM | WEIGHT: 176.4 LBS | SYSTOLIC BLOOD PRESSURE: 115 MMHG | HEART RATE: 64 BPM | TEMPERATURE: 97.6 F | OXYGEN SATURATION: 96 % | BODY MASS INDEX: 24.69 KG/M2 | DIASTOLIC BLOOD PRESSURE: 83 MMHG

## 2024-07-29 DIAGNOSIS — I10 ESSENTIAL HYPERTENSION WITH GOAL BLOOD PRESSURE LESS THAN 140/90: ICD-10-CM

## 2024-07-29 DIAGNOSIS — Z71.89 ACP (ADVANCE CARE PLANNING): Chronic | ICD-10-CM

## 2024-07-29 DIAGNOSIS — Z00.00 ENCOUNTER FOR MEDICARE ANNUAL WELLNESS EXAM: Primary | ICD-10-CM

## 2024-07-29 DIAGNOSIS — N18.2 CHRONIC KIDNEY DISEASE, STAGE II (MILD): ICD-10-CM

## 2024-07-29 DIAGNOSIS — F17.219 CIGARETTE NICOTINE DEPENDENCE WITH NICOTINE-INDUCED DISORDER: ICD-10-CM

## 2024-07-29 DIAGNOSIS — F20.0 PARANOID SCHIZOPHRENIA, CHRONIC CONDITION (H): ICD-10-CM

## 2024-07-29 DIAGNOSIS — Z13.220 SCREENING FOR LIPID DISORDERS: ICD-10-CM

## 2024-07-29 LAB
ANION GAP SERPL CALCULATED.3IONS-SCNC: 11 MMOL/L (ref 7–15)
BUN SERPL-MCNC: 13.7 MG/DL (ref 8–23)
CALCIUM SERPL-MCNC: 8.8 MG/DL (ref 8.8–10.4)
CHLORIDE SERPL-SCNC: 115 MMOL/L (ref 98–107)
CHOLEST SERPL-MCNC: 120 MG/DL
CREAT SERPL-MCNC: 1.47 MG/DL (ref 0.67–1.17)
CREAT UR-MCNC: 31.9 MG/DL
EGFRCR SERPLBLD CKD-EPI 2021: 53 ML/MIN/1.73M2
FASTING STATUS PATIENT QL REPORTED: ABNORMAL
FASTING STATUS PATIENT QL REPORTED: ABNORMAL
GLUCOSE SERPL-MCNC: 96 MG/DL (ref 70–99)
HCO3 SERPL-SCNC: 24 MMOL/L (ref 22–29)
HDLC SERPL-MCNC: 35 MG/DL
LDLC SERPL CALC-MCNC: 66 MG/DL
MICROALBUMIN UR-MCNC: <12 MG/L
MICROALBUMIN/CREAT UR: NORMAL MG/G{CREAT}
NONHDLC SERPL-MCNC: 85 MG/DL
POTASSIUM SERPL-SCNC: 3.8 MMOL/L (ref 3.4–5.3)
SODIUM SERPL-SCNC: 150 MMOL/L (ref 135–145)
TRIGL SERPL-MCNC: 95 MG/DL

## 2024-07-29 PROCEDURE — 99396 PREV VISIT EST AGE 40-64: CPT | Mod: 25

## 2024-07-29 PROCEDURE — 82570 ASSAY OF URINE CREATININE: CPT

## 2024-07-29 PROCEDURE — 80061 LIPID PANEL: CPT

## 2024-07-29 PROCEDURE — 91320 SARSCV2 VAC 30MCG TRS-SUC IM: CPT

## 2024-07-29 PROCEDURE — 82043 UR ALBUMIN QUANTITATIVE: CPT

## 2024-07-29 PROCEDURE — 80048 BASIC METABOLIC PNL TOTAL CA: CPT

## 2024-07-29 PROCEDURE — 36415 COLL VENOUS BLD VENIPUNCTURE: CPT

## 2024-07-29 PROCEDURE — 90480 ADMN SARSCOV2 VAC 1/ONLY CMP: CPT

## 2024-07-29 SDOH — HEALTH STABILITY: PHYSICAL HEALTH: ON AVERAGE, HOW MANY DAYS PER WEEK DO YOU ENGAGE IN MODERATE TO STRENUOUS EXERCISE (LIKE A BRISK WALK)?: 2 DAYS

## 2024-07-29 SDOH — HEALTH STABILITY: PHYSICAL HEALTH: ON AVERAGE, HOW MANY MINUTES DO YOU ENGAGE IN EXERCISE AT THIS LEVEL?: 30 MIN

## 2024-07-29 ASSESSMENT — SOCIAL DETERMINANTS OF HEALTH (SDOH): HOW OFTEN DO YOU GET TOGETHER WITH FRIENDS OR RELATIVES?: NEVER

## 2024-07-29 NOTE — PROGRESS NOTES
Preventive Care Visit  [facility]  Conrad Ibarra MD, Family Medicine  Jul 29, 2024    Assessment & Plan     Encounter for Medicare annual wellness exam  ACP (advance care planning)  No concerns today. Requested covid vaccine, administered today. Discussed getting zoster at a pharmacy. Gave ACP packet - has someone at home that can help him fill it out and discuss with him. Offered that we could have a visit to go over this and discuss it as well.     Chronic kidney disease, stage II (mild)  Essential hypertension with goal blood pressure less than 140/90  Recheck BMP and microalbumin today. Most recent microalbumin does not show signs of proteinuria, though he has had this in the past. He should be on an SLGT2 inhibitor, so will have him come back for another visit to discuss starting this new medication. Follow up in a couple weeks.  - Basic metabolic panel; Future  - Albumin Random Urine Quantitative with Creat Ratio; Future  - Basic metabolic panel  - Albumin Random Urine Quantitative with Creat Ratio    Cigarette nicotine dependence with nicotine-induced disorder  Does not want to quit smoking. Currently smoking 1 ppd (about 38 pack year history). Accepted CT screening (risk/benefit documentation below).  - CT Chest Lung Cancer Scrn Low Dose wo; Future    Screening for lipid disorders  Last lipid panel wnl in July 2023. Recheck today.  - Lipid panel; Future  - Lipid panel    Paranoid schizophrenia, chronic condition (H)  Follows with psychiatrist about every 5 months, has been stable on current medication regimen.    Nicotine/Tobacco Cessation  He reports that he has been smoking cigarettes. He has a 39 pack-year smoking history. He has never used smokeless tobacco.  Nicotine/Tobacco Cessation Plan  Information offered: Patient not interested at this time      Counseling  Appropriate preventive services were addressed with this patient via screening, questionnaire, or discussion as  appropriate for fall prevention, nutrition, physical activity, Tobacco-use cessation, weight loss and cognition.  Checklist reviewing preventive services available has been given to the patient.  Reviewed patient's diet, addressing concerns and/or questions.   He is at risk for lack of exercise and has been provided with information to increase physical activity for the benefit of his well-being.   Patient is at risk for social isolation and has been provided with information about the benefit of social connection.   The patient was instructed to see the dentist every 6 months.   Updated plan of care.  Patient reported difficulty with activities of daily living were addressed today.    Return in about 2 weeks (around 8/12/2024) for Follow up, with me.    Aurea Potts is a 64 year old, presenting for the following:  Physical    Health Care Directive  Patient has a Health Care Directive on file  Discussed advance care planning with patient.    HPI  Goes to psychiatrist - Dr. Abena Bajwa - once every 5 months.  Customized living (group home but with more on staff site)  Someone helps with medications        7/19/2023   General Health   How would you rate your overall physical health? Fair            7/19/2023   Nutrition   At least 4 servings of fruits and vegetables/day Yes            7/19/2023   Exercise   Frequency of exercise: 2-3 days/week   Goes for walks.         7/19/2023   Activities of Daily Living- Home Safety   Needs help with the following daily activites Transportation    Preparing meals    Housework    Bathing    Laundry    Medication administration    Money management   Safety concerns in the home None of the above       Multiple values from one day are sorted in reverse-chronological order         7/19/2023   Hearing Screening   Hearing concerns? Feel that people are mumbling or not speaking clearly    Find that men's voices are easier to understand than woman's       Multiple values from one  "day are sorted in reverse-chronological order     Today's PHQ-2 Score:       7/29/2024    10:53 AM   PHQ-2 ( 1999 [employer])   Q1: Little interest or pleasure in doing things 0   Q2: Feeling down, depressed or hopeless 0   PHQ-2 Score 0   Q1: Little interest or pleasure in doing things Not at all   Q2: Feeling down, depressed or hopeless Not at all   PHQ-2 Score 0         7/19/2023   Substance Use   Alcohol more than 3/day or more than 7/wk No        Social History     Tobacco Use    Smoking status: Every Day     Current packs/day: 1.00     Average packs/day: 1 pack/day for 39.0 years (39.0 ttl pk-yrs)     Types: Cigarettes    Smokeless tobacco: Never   Vaping Use    Vaping status: Never Used   Substance Use Topics    Alcohol use: No    Drug use: No   1 pack per day    Last PSA: No results found for: \"PSA\"  ASCVD Risk   The ASCVD Risk score (Perla REYES, et al., 2019) failed to calculate for the following reasons:    The valid total cholesterol range is 130 to 320 mg/dL    Reviewed and updated as needed this visit by Provider   Tobacco   Meds  Problems  Med Hx  Surg Hx  Fam Hx  Soc Hx Sexual   Activity        Current providers sharing in care for this patient include:  Patient Care Team:  Uma Villatoro DO as PCP - General (Family Medicine)  Tenisha Oleary, RN as Clinic Care Coordinator  Uma Villatoro DO as Assigned PCP    The following health maintenance items are reviewed in Epic and correct as of today:  Health Maintenance   Topic Date Due    ZOSTER IMMUNIZATION (1 of 2) Never done    RSV VACCINE (Pregnancy & 60+) (1 - 1-dose 60+ series) Never done    MEDICARE ANNUAL WELLNESS VISIT  07/19/2024    LUNG CANCER SCREENING  08/09/2024    INFLUENZA VACCINE (1) 09/01/2024    COLORECTAL CANCER SCREENING  11/08/2024    NICOTINE/TOBACCO CESSATION COUNSELING Q 1 YR  07/29/2025    BMP  07/29/2025    LIPID  07/29/2025    MICROALBUMIN  07/29/2025    GLUCOSE  07/29/2027    ADVANCE CARE PLANNING  " "07/29/2029    DTAP/TDAP/TD IMMUNIZATION (4 - Td or Tdap) 05/05/2032    HEPATITIS C SCREENING  Completed    HIV SCREENING  Completed    PHQ-2 (once per calendar year)  Completed    Pneumococcal Vaccine: Pediatrics (0 to 5 Years) and At-Risk Patients (6 to 64 Years)  Completed    URINALYSIS  Completed    HEPATITIS B IMMUNIZATION  Completed    COVID-19 Vaccine  Completed    IPV IMMUNIZATION  Aged Out    HPV IMMUNIZATION  Aged Out    MENINGITIS IMMUNIZATION  Aged Out    RSV MONOCLONAL ANTIBODY  Aged Out      Objective    Exam  /83   Pulse 64   Temp 97.6  F (36.4  C) (Oral)   Resp 16   Ht 1.803 m (5' 11\")   Wt 80 kg (176 lb 6.4 oz)   SpO2 96%   BMI 24.60 kg/m     Estimated body mass index is 24.6 kg/m  as calculated from the following:    Height as of this encounter: 1.803 m (5' 11\").    Weight as of this encounter: 80 kg (176 lb 6.4 oz).    Physical Exam  GENERAL: alert and no distress  EYES: Eyes grossly normal to inspection, PERRL and conjunctivae and sclerae normal  HENT: ear canals and TM's normal, nose and mouth without ulcers or lesions  RESP: lungs clear to auscultation - no rales, rhonchi or wheezes  CV: regular rate and rhythm, normal S1 S2, no S3 or S4, no murmur, click or rub, no peripheral edema  ABDOMEN: soft, nontender, no masses and bowel sounds normal  MS: no gross musculoskeletal defects noted, no edema  SKIN: no suspicious lesions or rashes  NEURO: Normal strength and tone, mentation intact and speech normal  PSYCH: mentation appears normal, affect normal/bright    Lung Cancer Screening Shared Decision Making Visit     Delroy Foley is eligible for lung cancer screening on the basis of:   has not not experienced symptoms suggestive of lung cancer.   born on 1960, 64 year old years old.   smoked 1 packs of cigarettes for 39 years for a total of 39 pack-years   is currently smoking.      I have discussed with patient the risks and benefits of screening for lung cancer with low-dose " CT.     The risks include:   radiation exposure    false positives     over-diagnosis    The benefit of early detection of lung cancer is contingent upon adherence to annual screening or more frequent follow up if indicated.     Furthermore, reaping the benefits of screening requires Delroy Foley to be willing and able to undergo diagnostic procedures, if indicated.     We did discuss that the only way to prevent lung cancer is to not smoke. Smoking cessation assistance was offered and declined.           7/29/2024   Mini Cog   Clock Draw Score 0 Abnormal   3 Item Recall 3 objects recalled   Mini Cog Total Score 3      Signed Electronically by: Conrad Ibarra MD

## 2024-07-29 NOTE — PROGRESS NOTES
Preventive Care Visit  [facility]  Conrad Ibarra MD, Family Medicine  Jul 29, 2024    Encounter for Medicare annual wellness exam  ACP (advance care planning)  No concerns today. Requested covid vaccine, administered today. Discussed getting zoster at a pharmacy. Gave ACP packet - has someone at home that can help him fill it out and discuss with him. Offered that we could have a visit to go over this and discuss it as well.     Chronic kidney disease, stage II (mild)  Essential hypertension with goal blood pressure less than 140/90  Recheck BMP and microalbumin today. Most recent microalbumin does not show signs of proteinuria, though he has had this in the past. He should be on an SLGT2 inhibitor, so will have him come back for another visit to discuss starting this new medication. Follow up in a couple weeks.  - Basic metabolic panel; Future  - Albumin Random Urine Quantitative with Creat Ratio; Future  - Basic metabolic panel  - Albumin Random Urine Quantitative with Creat Ratio    Cigarette nicotine dependence with nicotine-induced disorder  Does not want to quit smoking. Currently smoking 1 ppd (about 38 pack year history). Accepted CT screening (risk/benefit documentation below).  - CT Chest Lung Cancer Scrn Low Dose wo; Future    Screening for lipid disorders  Last lipid panel wnl in July 2023. Recheck today.  - Lipid panel; Future  - Lipid panel    Paranoid schizophrenia, chronic condition (H)  Follows with psychiatrist about every 5 months, has been stable on current medication regimen.    Nicotine/Tobacco Cessation  He reports that he has been smoking cigarettes. He has a 39 pack-year smoking history. He has never used smokeless tobacco.  Nicotine/Tobacco Cessation Plan  Information offered: Patient not interested at this time      Counseling  Appropriate preventive services were addressed with this patient via screening, questionnaire, or discussion as appropriate for fall  prevention, nutrition, physical activity, Tobacco-use cessation, weight loss and cognition.  Checklist reviewing preventive services available has been given to the patient.  Reviewed patient's diet, addressing concerns and/or questions.   He is at risk for lack of exercise and has been provided with information to increase physical activity for the benefit of his well-being.   Patient is at risk for social isolation and has been provided with information about the benefit of social connection.   The patient was instructed to see the dentist every 6 months.   Updated plan of care.  Patient reported difficulty with activities of daily living were addressed today.    Return in about 2 weeks (around 8/12/2024) for Follow up, with me.    Aurea Potts is a 64 year old, presenting for the following:  Physical        7/29/2024    10:54 AM   Additional Questions   Roomed by Doua   Accompanied by Self         7/29/2024    10:54 AM   Patient Reported Additional Medications   Patient reports taking the following new medications n/a         7/29/2024    Information    services provided? No        Health Care Directive  Discussed advance care planning with patient.    HPI  Goes to psychiatrist - Dr. Abena Bajwa - once every 5 months.  Customized living (group home but with more on staff site)  Someone helps with medications      7/29/2024   General Health   How would you rate your overall physical health? Good   Feel stress (tense, anxious, or unable to sleep) Not at all            7/29/2024   Nutrition   Diet: Regular (no restrictions)            7/29/2024   Exercise   Days per week of moderate/strenous exercise 2 days   Average minutes spent exercising at this level 30 min      (!) EXERCISE CONCERN      7/29/2024   Social Factors   Frequency of gathering with friends or relatives Never   Worry food won't last until get money to buy more No   Food not last or not have enough money for food? No   Do  you have housing? (Housing is defined as stable permanent housing and does not include staying ouside in a car, in a tent, in an abandoned building, in an overnight shelter, or couch-surfing.) Yes   Are you worried about losing your housing? No   Lack of transportation? No   Unable to get utilities (heat,electricity)? No      (!) SOCIAL CONNECTIONS CONCERN      7/29/2024   Fall Risk   Fallen 2 or more times in the past year? No   Trouble with walking or balance? Yes   Reason Gait Speed Test Not Completed Patient declines             7/29/2024   Activities of Daily Living- Home Safety   Needs help with the following daily activites Transportation    Preparing meals    Housework    Bathing    Laundry    Medication administration    Money management   Safety concerns in the home None of the above       Multiple values from one day are sorted in reverse-chronological order         7/29/2024   Dental   Dentist two times every year? (!) NO            7/29/2024   Hearing Screening   Hearing concerns? None of the above            7/29/2024   Driving Risk Screening   Patient/family members have concerns about driving No            7/29/2024   General Alertness/Fatigue Screening   Have you been more tired than usual lately? No            7/29/2024   Urinary Incontinence Screening   Bothered by leaking urine in past 6 months No            7/29/2024   TB Screening   Were you born outside of the [place]? No      Today's PHQ-2 Score:       7/29/2024    10:53 AM   PHQ-2 ( 1999 Pf[employer])   Q1: Little interest or pleasure in doing things 0   Q2: Feeling down, depressed or hopeless 0   PHQ-2 Score 0   Q1: Little interest or pleasure in doing things Not at all   Q2: Feeling down, depressed or hopeless Not at all   PHQ-2 Score 0           7/29/2024   Substance Use   Alcohol more than 3/day or more than 7/wk No   Do you have a current opioid prescription? No   How severe/bad is pain from 1 to 10? 0/10 (No Pain)   Do you use any other  "substances recreationally? No        Social History     Tobacco Use    Smoking status: Every Day     Current packs/day: 1.00     Average packs/day: 1 pack/day for 39.0 years (39.0 ttl pk-yrs)     Types: Cigarettes    Smokeless tobacco: Never   Vaping Use    Vaping status: Never Used   Substance Use Topics    Alcohol use: No    Drug use: No     Last PSA: No results found for: \"PSA\"  ASCVD Risk   The ASCVD Risk score (Perla REYES, et al., 2019) failed to calculate for the following reasons:    The valid total cholesterol range is 130 to 320 mg/dL    Reviewed and updated as needed this visit by Provider   Tobacco   Meds  Problems  Med Hx  Surg Hx  Fam Hx  Soc Hx Sexual   Activity          Current providers sharing in care for this patient include:  Patient Care Team:  Uma Villatoro DO as PCP - General (Family Medicine)  Tenisha Oleary RN as Clinic Care Coordinator  Uma Villatoro DO as Assigned PCP    The following health maintenance items are reviewed in Epic and correct as of today:  Health Maintenance   Topic Date Due    ZOSTER IMMUNIZATION (1 of 2) Never done    RSV VACCINE (Pregnancy & 60+) (1 - 1-dose 60+ series) Never done    MEDICARE ANNUAL WELLNESS VISIT  07/19/2024    LUNG CANCER SCREENING  08/09/2024    INFLUENZA VACCINE (1) 09/01/2024    COLORECTAL CANCER SCREENING  11/08/2024    NICOTINE/TOBACCO CESSATION COUNSELING Q 1 YR  07/29/2025    BMP  07/29/2025    LIPID  07/29/2025    MICROALBUMIN  07/29/2025    GLUCOSE  07/29/2027    ADVANCE CARE PLANNING  07/31/2029    DTAP/TDAP/TD IMMUNIZATION (4 - Td or Tdap) 05/05/2032    HEPATITIS C SCREENING  Completed    HIV SCREENING  Completed    PHQ-2 (once per calendar year)  Completed    Pneumococcal Vaccine: Pediatrics (0 to 5 Years) and At-Risk Patients (6 to 64 Years)  Completed    URINALYSIS  Completed    HEPATITIS B IMMUNIZATION  Completed    COVID-19 Vaccine  Completed    IPV IMMUNIZATION  Aged Out    HPV IMMUNIZATION  Aged Out    " "MENINGITIS IMMUNIZATION  Aged Out    RSV MONOCLONAL ANTIBODY  Aged Out        Objective    Exam  /83   Pulse 64   Temp 97.6  F (36.4  C) (Oral)   Resp 16   Ht 1.803 m (5' 11\")   Wt 80 kg (176 lb 6.4 oz)   SpO2 96%   BMI 24.60 kg/m     Estimated body mass index is 24.6 kg/m  as calculated from the following:    Height as of this encounter: 1.803 m (5' 11\").    Weight as of this encounter: 80 kg (176 lb 6.4 oz).    Physical Exam  GENERAL: alert and no distress  EYES: Eyes grossly normal to inspection, PERRL and conjunctivae and sclerae normal  HENT: ear canals and TM's normal, nose and mouth without ulcers or lesions  RESP: lungs clear to auscultation - no rales, rhonchi or wheezes  CV: regular rate and rhythm, normal S1 S2, no S3 or S4, no murmur, click or rub, no peripheral edema  ABDOMEN: soft, nontender, no masses and bowel sounds normal  MS: no gross musculoskeletal defects noted, no edema  SKIN: no suspicious lesions or rashes  NEURO: Normal strength and tone, mentation intact and speech normal  PSYCH: mentation appears normal, affect normal/bright    Lung Cancer Screening Shared Decision Making Visit     Delroy Foley is eligible for lung cancer screening on the basis of:   has not not experienced symptoms suggestive of lung cancer.   born on 1960, 64 year old years old.   smoked 1 packs of cigarettes for 39 years for a total of 39 pack-years   is currently smoking.      I have discussed with patient the risks and benefits of screening for lung cancer with low-dose CT.     The risks include:   radiation exposure    false positives     over-diagnosis    The benefit of early detection of lung cancer is contingent upon adherence to annual screening or more frequent follow up if indicated.     Furthermore, reaping the benefits of screening requires Delroy Foley to be willing and able to undergo diagnostic procedures, if indicated.     We did discuss that the only way to prevent lung cancer is " to not smoke. Smoking cessation assistance was offered and declined.        7/29/2024   Mini Cog   Clock Draw Score 0 Abnormal   3 Item Recall 3 objects recalled   Mini Cog Total Score 3            Signed Electronically by: Conrad Ibarra MD

## 2024-07-31 ENCOUNTER — TELEPHONE (OUTPATIENT)
Dept: FAMILY MEDICINE | Facility: CLINIC | Age: 64
End: 2024-07-31
Payer: COMMERCIAL

## 2024-07-31 NOTE — TELEPHONE ENCOUNTER
RN called and spoke to pt and relayed provider message below. RN attempted to get pt scheduled but he stated he will call back and schedule.    Teagan Saavedra RN----- Message from Conrad Ibarra sent at 7/30/2024  8:29 AM CDT -----  Hello,  Can you please call this patient with results that kidney level and function is stable from previous, but I recommend that he comes back in to clinic in the next couple of weeks to talk about starting a medication to protect the kidneys? We can discuss the sodium result further at that time as well.   Thank you!    Conrad

## 2024-07-31 NOTE — PATIENT INSTRUCTIONS
Patient Education   Preventive Care Advice   This is general advice given by our system to help you stay healthy. However, your care team may have specific advice just for you. Please talk to your care team about your preventive care needs.  Nutrition  Eat 5 or more servings of fruits and vegetables each day.  Try wheat bread, brown rice and whole grain pasta (instead of white bread, rice, and pasta).  Get enough calcium and vitamin D. Check the label on foods and aim for 100% of the RDA (recommended daily allowance).  Lifestyle  Exercise at least 150 minutes each week  (30 minutes a day, 5 days a week).  Do muscle strengthening activities 2 days a week. These help control your weight and prevent disease.  No smoking.  Wear sunscreen to prevent skin cancer.  Have a dental exam and cleaning every 6 months.  Yearly exams  See your health care team every year to talk about:  Any changes in your health.  Any medicines your care team has prescribed.  Preventive care, family planning, and ways to prevent chronic diseases.  Shots (vaccines)   HPV shots (up to age 26), if you've never had them before.  Hepatitis B shots (up to age 59), if you've never had them before.  COVID-19 shot: Get this shot when it's due.  Flu shot: Get a flu shot every year.  Tetanus shot: Get a tetanus shot every 10 years.  Pneumococcal, hepatitis A, and RSV shots: Ask your care team if you need these based on your risk.  Shingles shot (for age 50 and up)  General health tests  Diabetes screening:  Starting at age 35, Get screened for diabetes at least every 3 years.  If you are younger than age 35, ask your care team if you should be screened for diabetes.  Cholesterol test: At age 39, start having a cholesterol test every 5 years, or more often if advised.  Bone density scan (DEXA): At age 50, ask your care team if you should have this scan for osteoporosis (brittle bones).  Hepatitis C: Get tested at least once in your life.  STIs (sexually  transmitted infections)  Before age 24: Ask your care team if you should be screened for STIs.  After age 24: Get screened for STIs if you're at risk. You are at risk for STIs (including HIV) if:  You are sexually active with more than one person.  You don't use condoms every time.  You or a partner was diagnosed with a sexually transmitted infection.  If you are at risk for HIV, ask about PrEP medicine to prevent HIV.  Get tested for HIV at least once in your life, whether you are at risk for HIV or not.  Cancer screening tests  Cervical cancer screening: If you have a cervix, begin getting regular cervical cancer screening tests starting at age 21.  Breast cancer scan (mammogram): If you've ever had breasts, begin having regular mammograms starting at age 40. This is a scan to check for breast cancer.  Colon cancer screening: It is important to start screening for colon cancer at age 45.  Have a colonoscopy test every 10 years (or more often if you're at risk) Or, ask your provider about stool tests like a FIT test every year or Cologuard test every 3 years.  To learn more about your testing options, visit:   .  For help making a decision, visit:   https://bit.ly/js01835.  Prostate cancer screening test: If you have a prostate, ask your care team if a prostate cancer screening test (PSA) at age 55 is right for you.  Lung cancer screening: If you are a current or former smoker ages 50 to 80, ask your care team if ongoing lung cancer screenings are right for you.  For informational purposes only. Not to replace the advice of your health care provider. Copyright   2023 [facility]. All rights reserved. Clinically reviewed by the [facility] Transitions Program. [employer] 306681 - REV 01/24.  Learning About Activities of Daily Living  What are activities of daily living?     Activities of daily living (ADLs) are the basic self-care tasks you do every day. These include eating, bathing, dressing,  and moving around.  As you age, and if you have health problems, you may find that it's harder to do some of these tasks. If so, your doctor can suggest ideas that may help.  To measure what kind of help you may need, your doctor will ask how well you are able to do ADLs. Let your doctor know if there are any tasks that you are having trouble doing. This is an important first step to getting help. And when you have the help you need, you can stay as independent as possible.  How will a doctor assess your ADLs?  Asking about ADLs is part of a routine health checkup your doctor will likely do as you age. Your health check might be done in a doctor's office, in your home, or at a hospital. The goal is to find out if you are having any problems that could make it hard to care for yourself or that make it unsafe for you to be on your own.  To measure your ADLs, your doctor will ask how hard it is for you to do routine tasks. Your doctor may also want to know if you have changed the way you do a task because of a health problem. Your doctor may watch how you:  Walk back and forth.  Keep your balance while you stand or walk.  Move from sitting to standing or from a bed to a chair.  Button or unbutton a shirt or sweater.  Remove and put on your shoes.  It's common to feel a little worried or anxious if you find you can't do all the things you used to be able to do. Talking with your doctor about ADLs is a way to make sure you're as safe as possible and able to care for yourself as well as you can. You may want to bring a caregiver, friend, or family member to your checkup. They can help you talk to your doctor.  Follow-up care is a key part of your treatment and safety. Be sure to make and go to all appointments, and call your doctor if you are having problems. It's also a good idea to know your test results and keep a list of the medicines you take.  Current as of: October 24, 2023  Content Version: 14.1    1782-9980  [employer], [employer].   Care instructions adapted under license by your healthcare professional. If you have questions about a medical condition or this instruction, always ask your healthcare professional. [employer] disclaims any warranty or liability for your use of this information.    Preventing Falls: Care Instructions  Injuries and health problems such as trouble walking or poor eyesight can increase your risk of falling. So can some medicines. But there are things you can do to help prevent falls. You can exercise to get stronger. You can also arrange your home to make it safer.    Talk to your doctor about the medicines you take. Ask if any of them increase the risk of falls and whether they can be changed or stopped.   Try to exercise regularly. It can help improve your strength and balance. This can help lower your risk of falling.     Practice fall safety and prevention.    Wear low-heeled shoes that fit well and give your feet good support. Talk to your doctor if you have foot problems that make this hard.  Carry a cellphone or wear a medical alert device that you can use to call for help.  Use stepladders instead of chairs to reach high objects. Don't climb if you're at risk for falls. Ask for help, if needed.  Wear the correct eyeglasses, if you need them.    Make your home safer.    Remove rugs, cords, clutter, and furniture from walkways.  Keep your house well lit. Use night-lights in hallways and bathrooms.  Install and use sturdy handrails on stairways.  Wear nonskid footwear, even inside. Don't walk barefoot or in socks without shoes.    Be safe outside.    Use handrails, curb cuts, and ramps whenever possible.  Keep your hands free by using a shoulder bag or backpack.  Try to walk in well-lit areas. Watch out for uneven ground, changes in pavement, and debris.  Be careful in the winter. Walk on the grass or gravel when sidewalks are slippery. Use de-icer on steps and walkways.  "Add non-slip devices to shoes.    Put grab bars and nonskid mats in your shower or tub and near the toilet. Try to use a shower chair or bath bench when bathing.   Get into a tub or shower by putting in your weaker leg first. Get out with your strong side first. Have a phone or medical alert device in the bathroom with you.   Where can you learn more?  Go to https://www.[employer].net/patiented  Enter G117 in the search box to learn more about \"Preventing Falls: Care Instructions.\"  Current as of: July 17, 2023               Content Version: 14.0    8416-0856 [employer].   Care instructions adapted under license by your healthcare professional. If you have questions about a medical condition or this instruction, always ask your healthcare professional. [employer] disclaims any warranty or liability for your use of this information.         "

## 2024-07-31 NOTE — PROGRESS NOTES
Preceptor Attestation:   Patient seen, evaluated and discussed with the resident. I have verified the content of the note, which accurately reflects my assessment of the patient and the plan of care.   Supervising Physician:  Matt Frankel MD

## 2024-08-09 DIAGNOSIS — N40.1 BENIGN PROSTATIC HYPERPLASIA WITH URINARY FREQUENCY: ICD-10-CM

## 2024-08-09 DIAGNOSIS — R35.0 BENIGN PROSTATIC HYPERPLASIA WITH URINARY FREQUENCY: ICD-10-CM

## 2024-08-09 NOTE — TELEPHONE ENCOUNTER
"Request for medication refill:  tamsulosin (FLOMAX) 0.4 MG capsule   Providers if patient needs an appointment and you are willing to give a one month supply please refill for one month and  send a letter/MyChart using \".SMILLIMITEDREFILL\" .smillimited and route chart to \"[facility] \" (Giving one month refill in non controlled medications is strongly recommended before denial)    If refill has been denied, meaning absolutely no refills without visit, please complete the smart phrase \".smirxrefuse\" and route it to the \"[facility] MED REFILLS\"  pool to inform the patient and the pharmacy.    Melyssa Brown MA     " Size Of Lesion In Cm: 1.5

## 2024-08-13 RX ORDER — TAMSULOSIN HYDROCHLORIDE 0.4 MG/1
0.4 CAPSULE ORAL DAILY
Qty: 30 CAPSULE | Refills: 0 | Status: SHIPPED | OUTPATIENT
Start: 2024-08-13 | End: 2024-09-10

## 2024-09-06 DIAGNOSIS — I10 BENIGN ESSENTIAL HYPERTENSION: ICD-10-CM

## 2024-09-06 NOTE — TELEPHONE ENCOUNTER
"Request for medication refill:  amLODIPine (NORVASC) 5 MG tablet   Providers if patient needs an appointment and you are willing to give a one month supply please refill for one month and  send a letter/MyChart using \".SMILLIMITEDREFILL\" .smillimited and route chart to \"P [facility] \" (Giving one month refill in non controlled medications is strongly recommended before denial)    If refill has been denied, meaning absolutely no refills without visit, please complete the smart phrase \".smirxrefuse\" and route it to the \"[facility] MED REFILLS\"  pool to inform the patient and the pharmacy.    Melyssa Brown MA     "

## 2024-09-09 RX ORDER — AMLODIPINE BESYLATE 5 MG/1
TABLET ORAL
Qty: 135 TABLET | Refills: 3 | Status: SHIPPED | OUTPATIENT
Start: 2024-09-09

## 2024-09-10 DIAGNOSIS — R35.0 BENIGN PROSTATIC HYPERPLASIA WITH URINARY FREQUENCY: ICD-10-CM

## 2024-09-10 DIAGNOSIS — N40.1 BENIGN PROSTATIC HYPERPLASIA WITH URINARY FREQUENCY: ICD-10-CM

## 2024-09-10 RX ORDER — TAMSULOSIN HYDROCHLORIDE 0.4 MG/1
0.4 CAPSULE ORAL DAILY
Qty: 90 CAPSULE | Refills: 0 | Status: SHIPPED | OUTPATIENT
Start: 2024-09-10

## 2024-09-10 NOTE — TELEPHONE ENCOUNTER
"Request for medication refill:    tamsulosin (FLOMAX) 0.4 MG capsule     Providers if patient needs an appointment and you are willing to give a one month supply please refill for one month and  send a letter/MyChart using \".SMILLIMITEDREFILL\" .smillimited and route chart to \"P [facility] \" (Giving one month refill in non controlled medications is strongly recommended before denial)    If refill has been denied, meaning absolutely no refills without visit, please complete the smart phrase \".smirxrefuse\" and route it to the \"[facility] MED REFILLS\"  pool to inform the patient and the pharmacy.    Anna Ozuna MA      "

## 2024-12-08 DIAGNOSIS — R35.0 BENIGN PROSTATIC HYPERPLASIA WITH URINARY FREQUENCY: ICD-10-CM

## 2024-12-08 DIAGNOSIS — N40.1 BENIGN PROSTATIC HYPERPLASIA WITH URINARY FREQUENCY: ICD-10-CM

## 2024-12-09 NOTE — TELEPHONE ENCOUNTER
"Request for medication refill:tamsulosin (FLOMAX) 0.4 MG capsule     Providers if patient needs an appointment and you are willing to give a one month supply please refill for one month and  send a letter/MyChart using \".SMILLIMITEDREFILL\" .smillimited and route chart to \"[facility] \" (Giving one month refill in non controlled medications is strongly recommended before denial)    If refill has been denied, meaning absolutely no refills without visit, please complete the smart phrase \".smirxrefuse\" and route it to the \"P [facility] MED REFILLS\"  pool to inform the patient and the pharmacy.    Ham Manrique, CMA    "

## 2024-12-11 RX ORDER — TAMSULOSIN HYDROCHLORIDE 0.4 MG/1
0.4 CAPSULE ORAL DAILY
Qty: 90 CAPSULE | Refills: 0 | Status: SHIPPED | OUTPATIENT
Start: 2024-12-11

## 2024-12-16 ENCOUNTER — OFFICE VISIT (OUTPATIENT)
Dept: FAMILY MEDICINE | Facility: CLINIC | Age: 64
End: 2024-12-16
Payer: COMMERCIAL

## 2024-12-16 VITALS
OXYGEN SATURATION: 98 % | HEIGHT: 71 IN | BODY MASS INDEX: 22.96 KG/M2 | SYSTOLIC BLOOD PRESSURE: 140 MMHG | WEIGHT: 164 LBS | RESPIRATION RATE: 16 BRPM | TEMPERATURE: 96.9 F | DIASTOLIC BLOOD PRESSURE: 105 MMHG | HEART RATE: 104 BPM

## 2024-12-16 DIAGNOSIS — B07.0 PLANTAR WART OF LEFT FOOT: Primary | ICD-10-CM

## 2024-12-16 NOTE — PROGRESS NOTES
"Assessment & Plan     Pain of left heel  Plantar Wart  Based on exam and history this is likely related to the hardened wart on his left heel. Referred pain and right foot discomfort likely due to compensatory walking.  - Scheduled for procedure clinic for removal of Left heel wart.    Return in about 4 weeks (around 1/13/2025) for [facility] procedure clinic for wart removal.    Subjective   Delroy is a 64 year old, presenting for the following health issues:  OTHER (Numbness in left/right foot, a little pain)        12/16/2024    10:45 AM   Additional Questions   Roomed by Ohn   Accompanied by Self         12/16/2024    Information    services provided? No        HPI     Delroy is having numbness and tingling in his heel. L>R. Has noticed this for a couple of months. At night is the worst, however his symptoms are constant and do not go away. Pain is at back underside of heel where pressure is when he walks. Shooting pain into his calf happens frequently. No limitations in walking or sensation. Able to stand for long periods of time without issue. No other pain in his feet or knees.    Review of Systems  Constitutional, HEENT, cardiovascular, pulmonary, gi and gu systems are negative, except as otherwise noted.      Objective    BP (!) 140/105   Pulse 104   Temp 96.9  F (36.1  C) (Tympanic)   Resp 16   Ht 1.803 m (5' 11\")   Wt 74.4 kg (164 lb)   SpO2 98%   BMI 22.87 kg/m    Body mass index is 22.87 kg/m .  Physical Exam   GENERAL: alert and no distress  NECK: no adenopathy, no asymmetry, masses, or scars  RESP: lungs clear to auscultation - no rales, rhonchi or wheezes  CV: regular rate and rhythm, normal S1 S2, no S3 or S4, no murmur, click or rub, no peripheral edema  MS: no gross musculoskeletal defects noted, no edema. Foot exam; [place] ankle exam reassuring. 5/5 strength bilateral knees and ankles.  SKIN: Feet w/significant bilateral xerosis and 1 cm wide, moderately raised hardened " wart on plantar aspect of L calcaneus.    Jarred Jesus MS3    I was present with the medical student who participated in the service and in the documentation of this note. I have verified the history, physical exam, content of the note, and medical decision-making which accurately reflects my assessment of the patient and the plan of care.  Signed Electronically by: Andry Arechiga DO

## 2024-12-17 NOTE — PROGRESS NOTES
Preceptor Attestation:   Patient seen, evaluated and discussed with the resident. I have verified the content of the note, which accurately reflects my assessment of the patient and the plan of care.   Supervising Physician:  Markell Christian MD

## 2025-01-10 ENCOUNTER — OFFICE VISIT (OUTPATIENT)
Dept: FAMILY MEDICINE | Facility: CLINIC | Age: 65
End: 2025-01-10
Payer: COMMERCIAL

## 2025-01-10 VITALS
OXYGEN SATURATION: 100 % | HEART RATE: 80 BPM | WEIGHT: 164 LBS | HEIGHT: 71 IN | TEMPERATURE: 96.5 F | DIASTOLIC BLOOD PRESSURE: 76 MMHG | BODY MASS INDEX: 22.96 KG/M2 | SYSTOLIC BLOOD PRESSURE: 113 MMHG | RESPIRATION RATE: 16 BRPM

## 2025-01-10 DIAGNOSIS — B07.0 PLANTAR WARTS: Primary | ICD-10-CM

## 2025-01-10 ASSESSMENT — PAIN SCALES - GENERAL: PAINLEVEL_OUTOF10: NO PAIN (0)

## 2025-01-10 NOTE — PROCEDURES
Cryotherapy Note    Delroy is a 64 year old patient who presents for treatment of a lesion on left plantar foot.    Pre-procedure diagnosis: Plantar wart with overlying callous formation  Post-procedure diagnosis: Unchanged  Consent: Verbal, explained risks (scarring, recurrence, pain) and benefits of treatment (resolution of lesion)  and non treatment with patient and patient expressed understanding and a desire to continue treatment.    Prior to liquid nitrogen removal, debridement of overlying callous with scalpel. Liquid nitrogen was used directly on the lesion(s) on plantar left foot.  A total of 1 lesions were treated with 3 freeze thaw cycles.    Patient tolerated procedure well.  Advised patient to return in 4 weeks for recheck/retreatment if needed.    Susan Katz MD     Preceptor Attestation:   I was present for and supervised the entire procedure. I have verified the content of the note, which accurately reflects my assessment of the patient and the plan of care.   Supervising Physician:  Contreras Carvajal MD.

## 2025-01-13 NOTE — PROGRESS NOTES
Preceptor Attestation:    I discussed the patient with the resident and evaluated the patient in person. I was present for and supervised the entire procedure. I have verified the content of the note, which accurately reflects my assessment of the patient and the plan of care.   Supervising Physician:  Contreras Carvajal MD.

## 2025-08-30 DIAGNOSIS — I10 BENIGN ESSENTIAL HYPERTENSION: ICD-10-CM

## 2025-09-02 RX ORDER — AMLODIPINE BESYLATE 5 MG/1
TABLET ORAL
Qty: 135 TABLET | Refills: 3 | Status: SHIPPED | OUTPATIENT
Start: 2025-09-02

## (undated) DEVICE — Device

## (undated) DEVICE — ENDO TRAP POLYP E-TRAP 00711099

## (undated) DEVICE — SOL WATER IRRIG 500ML BOTTLE 2F7113

## (undated) DEVICE — KIT ENDO TURNOVER/PROCEDURE CARRY-ON 101822

## (undated) DEVICE — GOWN IMPERVIOUS 2XL BLUE

## (undated) DEVICE — ENDO SNARE EXACTO COLD 9MM LOOP 2.4MMX230CM 00711115

## (undated) DEVICE — SPECIMEN CONTAINER 3OZ W/FORMALIN 59901

## (undated) DEVICE — SUCTION MANIFOLD NEPTUNE 2 SYS 1 PORT 702-025-000

## (undated) DEVICE — SNARE CAPIVATOR ROUND COLD SNR BX10 M00561101

## (undated) DEVICE — ENDO SNARE POLYPECTOMY OVAL 10MM LOOP SD-240U-10

## (undated) DEVICE — TUBING SUCTION 12"X1/4" N612

## (undated) RX ORDER — FENTANYL CITRATE 50 UG/ML
INJECTION, SOLUTION INTRAMUSCULAR; INTRAVENOUS
Status: DISPENSED
Start: 2018-01-11

## (undated) RX ORDER — PROPOFOL 10 MG/ML
INJECTION, EMULSION INTRAVENOUS
Status: DISPENSED
Start: 2018-02-22

## (undated) RX ORDER — ONDANSETRON 2 MG/ML
INJECTION INTRAMUSCULAR; INTRAVENOUS
Status: DISPENSED
Start: 2018-02-22

## (undated) RX ORDER — SIMETHICONE 20 MG/.3ML
EMULSION ORAL
Status: DISPENSED
Start: 2018-02-22

## (undated) RX ORDER — FENTANYL CITRATE 50 UG/ML
INJECTION, SOLUTION INTRAMUSCULAR; INTRAVENOUS
Status: DISPENSED
Start: 2018-02-22

## (undated) RX ORDER — LIDOCAINE HYDROCHLORIDE 20 MG/ML
INJECTION, SOLUTION EPIDURAL; INFILTRATION; INTRACAUDAL; PERINEURAL
Status: DISPENSED
Start: 2018-02-22